# Patient Record
Sex: MALE | Race: WHITE | NOT HISPANIC OR LATINO | Employment: OTHER | URBAN - METROPOLITAN AREA
[De-identification: names, ages, dates, MRNs, and addresses within clinical notes are randomized per-mention and may not be internally consistent; named-entity substitution may affect disease eponyms.]

---

## 2021-01-14 DIAGNOSIS — Z23 NEED FOR VACCINATION: ICD-10-CM

## 2022-01-26 ENCOUNTER — APPOINTMENT (OUTPATIENT)
Dept: RADIOLOGY | Facility: MEDICAL CENTER | Age: 77
DRG: 200 | End: 2022-01-26
Attending: EMERGENCY MEDICINE
Payer: COMMERCIAL

## 2022-01-26 ENCOUNTER — HOSPITAL ENCOUNTER (INPATIENT)
Facility: MEDICAL CENTER | Age: 77
LOS: 8 days | DRG: 200 | End: 2022-02-04
Attending: EMERGENCY MEDICINE | Admitting: SURGERY
Payer: COMMERCIAL

## 2022-01-26 DIAGNOSIS — S22.42XA TRAUMATIC FRACTURE OF RIBS OF LEFT SIDE WITH PNEUMOTHORAX: ICD-10-CM

## 2022-01-26 DIAGNOSIS — S27.0XXA TRAUMATIC FRACTURE OF RIBS OF LEFT SIDE WITH PNEUMOTHORAX: ICD-10-CM

## 2022-01-26 DIAGNOSIS — T79.6XXA TRAUMATIC RHABDOMYOLYSIS, INITIAL ENCOUNTER (HCC): ICD-10-CM

## 2022-01-26 DIAGNOSIS — T14.90XA TRAUMA: ICD-10-CM

## 2022-01-26 DIAGNOSIS — R29.6 FREQUENT FALLS: ICD-10-CM

## 2022-01-26 DIAGNOSIS — E86.0 DEHYDRATION: ICD-10-CM

## 2022-01-26 DIAGNOSIS — S22.49XA MULTIPLE RIB FRACTURES INVOLVING FOUR OR MORE RIBS: ICD-10-CM

## 2022-01-26 DIAGNOSIS — S32.425A CLOSED NONDISPLACED FRACTURE OF POSTERIOR WALL OF LEFT ACETABULUM, INITIAL ENCOUNTER (HCC): ICD-10-CM

## 2022-01-26 LAB
ERYTHROCYTE [DISTWIDTH] IN BLOOD BY AUTOMATED COUNT: 48.2 FL (ref 35.9–50)
HCT VFR BLD AUTO: 28.4 % (ref 42–52)
HGB BLD-MCNC: 9.7 G/DL (ref 14–18)
MCH RBC QN AUTO: 33.1 PG (ref 27–33)
MCHC RBC AUTO-ENTMCNC: 34.2 G/DL (ref 33.7–35.3)
MCV RBC AUTO: 96.9 FL (ref 81.4–97.8)
PLATELET # BLD AUTO: 216 K/UL (ref 164–446)
PMV BLD AUTO: 9.5 FL (ref 9–12.9)
RBC # BLD AUTO: 2.93 M/UL (ref 4.7–6.1)
WBC # BLD AUTO: 8.1 K/UL (ref 4.8–10.8)

## 2022-01-26 PROCEDURE — 84484 ASSAY OF TROPONIN QUANT: CPT

## 2022-01-26 PROCEDURE — 99291 CRITICAL CARE FIRST HOUR: CPT

## 2022-01-26 PROCEDURE — 85730 THROMBOPLASTIN TIME PARTIAL: CPT

## 2022-01-26 PROCEDURE — 82550 ASSAY OF CK (CPK): CPT

## 2022-01-26 PROCEDURE — 82077 ASSAY SPEC XCP UR&BREATH IA: CPT

## 2022-01-26 PROCEDURE — 83540 ASSAY OF IRON: CPT

## 2022-01-26 PROCEDURE — G0390 TRAUMA RESPONS W/HOSP CRITI: HCPCS

## 2022-01-26 PROCEDURE — 86850 RBC ANTIBODY SCREEN: CPT

## 2022-01-26 PROCEDURE — 83550 IRON BINDING TEST: CPT

## 2022-01-26 PROCEDURE — 85610 PROTHROMBIN TIME: CPT

## 2022-01-26 PROCEDURE — 80053 COMPREHEN METABOLIC PANEL: CPT

## 2022-01-26 PROCEDURE — 86901 BLOOD TYPING SEROLOGIC RH(D): CPT

## 2022-01-26 PROCEDURE — 85027 COMPLETE CBC AUTOMATED: CPT

## 2022-01-26 PROCEDURE — 85046 RETICYTE/HGB CONCENTRATE: CPT

## 2022-01-26 PROCEDURE — 86900 BLOOD TYPING SEROLOGIC ABO: CPT

## 2022-01-27 ENCOUNTER — HOSPITAL ENCOUNTER (OUTPATIENT)
Dept: RADIOLOGY | Facility: MEDICAL CENTER | Age: 77
End: 2022-01-27

## 2022-01-27 ENCOUNTER — APPOINTMENT (OUTPATIENT)
Dept: RADIOLOGY | Facility: MEDICAL CENTER | Age: 77
DRG: 200 | End: 2022-01-27
Attending: SURGERY
Payer: COMMERCIAL

## 2022-01-27 PROBLEM — R29.6 FREQUENT FALLS: Status: ACTIVE | Noted: 2022-01-27

## 2022-01-27 PROBLEM — Z11.52 ENCOUNTER FOR SCREENING FOR COVID-19: Status: ACTIVE | Noted: 2022-01-27

## 2022-01-27 PROBLEM — Z78.9 NO CONTRAINDICATION TO DEEP VEIN THROMBOSIS (DVT) PROPHYLAXIS: Status: ACTIVE | Noted: 2022-01-27

## 2022-01-27 PROBLEM — N40.0 BPH (BENIGN PROSTATIC HYPERPLASIA): Status: ACTIVE | Noted: 2022-01-27

## 2022-01-27 PROBLEM — S22.49XA MULTIPLE RIB FRACTURES INVOLVING FOUR OR MORE RIBS: Status: ACTIVE | Noted: 2022-01-27

## 2022-01-27 PROBLEM — I10 HYPERTENSION: Status: ACTIVE | Noted: 2022-01-27

## 2022-01-27 PROBLEM — S72.002A FRACTURE OF LEFT HIP (HCC): Status: ACTIVE | Noted: 2022-01-27

## 2022-01-27 PROBLEM — S27.0XXA TRAUMATIC PNEUMOTHORAX: Status: ACTIVE | Noted: 2022-01-27

## 2022-01-27 PROBLEM — T14.90XA TRAUMA: Status: ACTIVE | Noted: 2022-01-27

## 2022-01-27 PROBLEM — E78.5 HYPERLIPIDEMIA: Status: ACTIVE | Noted: 2022-01-27

## 2022-01-27 PROBLEM — M25.552 LEFT HIP PAIN: Status: ACTIVE | Noted: 2022-01-27

## 2022-01-27 PROBLEM — Z53.09 CONTRAINDICATION TO DEEP VEIN THROMBOSIS (DVT) PROPHYLAXIS: Status: ACTIVE | Noted: 2022-01-27

## 2022-01-27 LAB
ABO + RH BLD: NORMAL
ABO GROUP BLD: NORMAL
ALBUMIN SERPL BCP-MCNC: 2.9 G/DL (ref 3.2–4.9)
ALBUMIN/GLOB SERPL: 1.5 G/DL
ALP SERPL-CCNC: 71 U/L (ref 30–99)
ALT SERPL-CCNC: 19 U/L (ref 2–50)
ANION GAP SERPL CALC-SCNC: 15 MMOL/L (ref 7–16)
APTT PPP: 28.9 SEC (ref 24.7–36)
AST SERPL-CCNC: 47 U/L (ref 12–45)
BILIRUB SERPL-MCNC: 2.2 MG/DL (ref 0.1–1.5)
BLD GP AB SCN SERPL QL: NORMAL
BUN SERPL-MCNC: 17 MG/DL (ref 8–22)
CALCIUM SERPL-MCNC: 8.1 MG/DL (ref 8.5–10.5)
CHLORIDE SERPL-SCNC: 105 MMOL/L (ref 96–112)
CK SERPL-CCNC: 1008 U/L (ref 0–154)
CK SERPL-CCNC: 573 U/L (ref 0–154)
CO2 SERPL-SCNC: 17 MMOL/L (ref 20–33)
CREAT SERPL-MCNC: 0.83 MG/DL (ref 0.5–1.4)
ETHANOL BLD-MCNC: <10.1 MG/DL (ref 0–10)
GLOBULIN SER CALC-MCNC: 2 G/DL (ref 1.9–3.5)
GLUCOSE SERPL-MCNC: 71 MG/DL (ref 65–99)
HGB RETIC QN AUTO: 35.9 PG/CELL (ref 29–35)
IMM RETICS NFR: 20.7 % (ref 9.3–17.4)
INR PPP: 1.44 (ref 0.87–1.13)
IRON SATN MFR SERPL: 22 % (ref 15–55)
IRON SERPL-MCNC: 42 UG/DL (ref 50–180)
POTASSIUM SERPL-SCNC: 3.6 MMOL/L (ref 3.6–5.5)
PROT SERPL-MCNC: 4.9 G/DL (ref 6–8.2)
PROTHROMBIN TIME: 17.1 SEC (ref 12–14.6)
RETICS # AUTO: 0.09 M/UL (ref 0.04–0.06)
RETICS/RBC NFR: 3.2 % (ref 0.8–2.1)
RH BLD: NORMAL
SARS-COV+SARS-COV-2 AG RESP QL IA.RAPID: NOTDETECTED
SODIUM SERPL-SCNC: 137 MMOL/L (ref 135–145)
SPECIMEN SOURCE: NORMAL
TIBC SERPL-MCNC: 189 UG/DL (ref 250–450)
TROPONIN T SERPL-MCNC: 53 NG/L (ref 6–19)
UIBC SERPL-MCNC: 147 UG/DL (ref 110–370)

## 2022-01-27 PROCEDURE — 71045 X-RAY EXAM CHEST 1 VIEW: CPT

## 2022-01-27 PROCEDURE — 700105 HCHG RX REV CODE 258: Performed by: EMERGENCY MEDICINE

## 2022-01-27 PROCEDURE — 700101 HCHG RX REV CODE 250: Performed by: SURGERY

## 2022-01-27 PROCEDURE — 700111 HCHG RX REV CODE 636 W/ 250 OVERRIDE (IP): Performed by: SURGERY

## 2022-01-27 PROCEDURE — 770022 HCHG ROOM/CARE - ICU (200)

## 2022-01-27 PROCEDURE — 700102 HCHG RX REV CODE 250 W/ 637 OVERRIDE(OP): Performed by: NURSE PRACTITIONER

## 2022-01-27 PROCEDURE — 700105 HCHG RX REV CODE 258: Performed by: SURGERY

## 2022-01-27 PROCEDURE — 32551 INSERTION OF CHEST TUBE: CPT | Mod: LT | Performed by: SURGERY

## 2022-01-27 PROCEDURE — 99152 MOD SED SAME PHYS/QHP 5/>YRS: CPT

## 2022-01-27 PROCEDURE — 32551 INSERTION OF CHEST TUBE: CPT

## 2022-01-27 PROCEDURE — C1729 CATH, DRAINAGE: HCPCS

## 2022-01-27 PROCEDURE — 99233 SBSQ HOSP IP/OBS HIGH 50: CPT | Performed by: SURGERY

## 2022-01-27 PROCEDURE — 0W9B30Z DRAINAGE OF LEFT PLEURAL CAVITY WITH DRAINAGE DEVICE, PERCUTANEOUS APPROACH: ICD-10-PCS | Performed by: SURGERY

## 2022-01-27 PROCEDURE — 700102 HCHG RX REV CODE 250 W/ 637 OVERRIDE(OP): Performed by: SURGERY

## 2022-01-27 PROCEDURE — 99291 CRITICAL CARE FIRST HOUR: CPT | Mod: 25 | Performed by: SURGERY

## 2022-01-27 PROCEDURE — 87426 SARSCOV CORONAVIRUS AG IA: CPT

## 2022-01-27 PROCEDURE — 94669 MECHANICAL CHEST WALL OSCILL: CPT

## 2022-01-27 PROCEDURE — A9270 NON-COVERED ITEM OR SERVICE: HCPCS | Performed by: NURSE PRACTITIONER

## 2022-01-27 PROCEDURE — 82550 ASSAY OF CK (CPK): CPT

## 2022-01-27 PROCEDURE — A9270 NON-COVERED ITEM OR SERVICE: HCPCS | Performed by: SURGERY

## 2022-01-27 RX ORDER — BISACODYL 10 MG
10 SUPPOSITORY, RECTAL RECTAL
Status: DISCONTINUED | OUTPATIENT
Start: 2022-01-27 | End: 2022-02-04 | Stop reason: HOSPADM

## 2022-01-27 RX ORDER — LISINOPRIL 10 MG/1
10 TABLET ORAL DAILY
COMMUNITY

## 2022-01-27 RX ORDER — MORPHINE SULFATE 4 MG/ML
2 INJECTION INTRAVENOUS
Status: DISCONTINUED | OUTPATIENT
Start: 2022-01-27 | End: 2022-02-01

## 2022-01-27 RX ORDER — DOCUSATE SODIUM 100 MG/1
100 CAPSULE, LIQUID FILLED ORAL 2 TIMES DAILY
Status: DISCONTINUED | OUTPATIENT
Start: 2022-01-27 | End: 2022-02-04 | Stop reason: HOSPADM

## 2022-01-27 RX ORDER — AMOXICILLIN 250 MG
1 CAPSULE ORAL NIGHTLY
Status: DISCONTINUED | OUTPATIENT
Start: 2022-01-27 | End: 2022-02-03

## 2022-01-27 RX ORDER — ONDANSETRON 2 MG/ML
4 INJECTION INTRAMUSCULAR; INTRAVENOUS EVERY 4 HOURS PRN
Status: DISCONTINUED | OUTPATIENT
Start: 2022-01-27 | End: 2022-02-01

## 2022-01-27 RX ORDER — POLYETHYLENE GLYCOL 3350 17 G/17G
1 POWDER, FOR SOLUTION ORAL 2 TIMES DAILY
Status: DISCONTINUED | OUTPATIENT
Start: 2022-01-27 | End: 2022-02-03

## 2022-01-27 RX ORDER — PROPOFOL 10 MG/ML
200 INJECTION, EMULSION INTRAVENOUS ONCE
Status: COMPLETED | OUTPATIENT
Start: 2022-01-27 | End: 2022-01-27

## 2022-01-27 RX ORDER — TERAZOSIN 5 MG/1
5 CAPSULE ORAL NIGHTLY
Status: DISCONTINUED | OUTPATIENT
Start: 2022-01-27 | End: 2022-02-04 | Stop reason: HOSPADM

## 2022-01-27 RX ORDER — SODIUM CHLORIDE 9 MG/ML
INJECTION, SOLUTION INTRAVENOUS CONTINUOUS
Status: DISCONTINUED | OUTPATIENT
Start: 2022-01-27 | End: 2022-01-27

## 2022-01-27 RX ORDER — TERAZOSIN 5 MG/1
5 CAPSULE ORAL NIGHTLY
COMMUNITY

## 2022-01-27 RX ORDER — LIDOCAINE 50 MG/G
1 PATCH TOPICAL EVERY 24 HOURS
Status: DISCONTINUED | OUTPATIENT
Start: 2022-01-27 | End: 2022-02-03

## 2022-01-27 RX ORDER — TRAMADOL HYDROCHLORIDE 50 MG/1
50 TABLET ORAL EVERY 4 HOURS PRN
Status: DISCONTINUED | OUTPATIENT
Start: 2022-01-27 | End: 2022-02-03

## 2022-01-27 RX ORDER — ONDANSETRON 4 MG/1
4 TABLET, ORALLY DISINTEGRATING ORAL EVERY 4 HOURS PRN
Status: DISCONTINUED | OUTPATIENT
Start: 2022-01-27 | End: 2022-02-04 | Stop reason: HOSPADM

## 2022-01-27 RX ORDER — AMOXICILLIN 250 MG
1 CAPSULE ORAL
Status: DISCONTINUED | OUTPATIENT
Start: 2022-01-27 | End: 2022-02-04 | Stop reason: HOSPADM

## 2022-01-27 RX ORDER — LISINOPRIL 10 MG/1
10 TABLET ORAL DAILY
Status: DISCONTINUED | OUTPATIENT
Start: 2022-01-27 | End: 2022-02-04 | Stop reason: HOSPADM

## 2022-01-27 RX ORDER — CALCIUM CARBONATE 500 MG/1
500 TABLET, CHEWABLE ORAL 2 TIMES DAILY
Status: ON HOLD | COMMUNITY
End: 2022-01-27

## 2022-01-27 RX ORDER — ACETAMINOPHEN 325 MG/1
650 TABLET ORAL 4 TIMES DAILY
Status: DISPENSED | OUTPATIENT
Start: 2022-01-27 | End: 2022-02-01

## 2022-01-27 RX ORDER — ENEMA 19; 7 G/133ML; G/133ML
1 ENEMA RECTAL
Status: DISCONTINUED | OUTPATIENT
Start: 2022-01-27 | End: 2022-02-04 | Stop reason: HOSPADM

## 2022-01-27 RX ORDER — SODIUM CHLORIDE, SODIUM LACTATE, POTASSIUM CHLORIDE, CALCIUM CHLORIDE 600; 310; 30; 20 MG/100ML; MG/100ML; MG/100ML; MG/100ML
INJECTION, SOLUTION INTRAVENOUS CONTINUOUS
Status: DISCONTINUED | OUTPATIENT
Start: 2022-01-27 | End: 2022-01-29

## 2022-01-27 RX ORDER — SIMVASTATIN 20 MG
20 TABLET ORAL NIGHTLY
COMMUNITY

## 2022-01-27 RX ORDER — SIMVASTATIN 10 MG
20 TABLET ORAL NIGHTLY
Status: DISCONTINUED | OUTPATIENT
Start: 2022-01-27 | End: 2022-02-04 | Stop reason: HOSPADM

## 2022-01-27 RX ORDER — GABAPENTIN 100 MG/1
100 CAPSULE ORAL EVERY 8 HOURS
Status: DISCONTINUED | OUTPATIENT
Start: 2022-01-27 | End: 2022-02-01

## 2022-01-27 RX ORDER — TRAMADOL HYDROCHLORIDE 50 MG/1
25 TABLET ORAL EVERY 4 HOURS PRN
Status: DISCONTINUED | OUTPATIENT
Start: 2022-01-27 | End: 2022-02-03

## 2022-01-27 RX ADMIN — DOCUSATE SODIUM 100 MG: 100 CAPSULE, LIQUID FILLED ORAL at 05:39

## 2022-01-27 RX ADMIN — TERAZOSIN HYDROCHLORIDE 5 MG: 5 CAPSULE ORAL at 21:07

## 2022-01-27 RX ADMIN — METOPROLOL TARTRATE 12.5 MG: 25 TABLET, FILM COATED ORAL at 05:38

## 2022-01-27 RX ADMIN — LISINOPRIL 10 MG: 5 TABLET ORAL at 05:38

## 2022-01-27 RX ADMIN — SENNOSIDES AND DOCUSATE SODIUM 1 TABLET: 50; 8.6 TABLET ORAL at 21:08

## 2022-01-27 RX ADMIN — SODIUM CHLORIDE: 9 INJECTION, SOLUTION INTRAVENOUS at 00:30

## 2022-01-27 RX ADMIN — GABAPENTIN 100 MG: 100 CAPSULE ORAL at 05:40

## 2022-01-27 RX ADMIN — SIMVASTATIN 20 MG: 10 TABLET, FILM COATED ORAL at 21:06

## 2022-01-27 RX ADMIN — PROPOFOL 30 MG: 10 INJECTION, EMULSION INTRAVENOUS at 01:32

## 2022-01-27 RX ADMIN — LIDOCAINE 1 PATCH: 50 PATCH TOPICAL at 05:39

## 2022-01-27 RX ADMIN — GABAPENTIN 100 MG: 100 CAPSULE ORAL at 21:06

## 2022-01-27 RX ADMIN — ACETAMINOPHEN 650 MG: 325 TABLET, FILM COATED ORAL at 21:06

## 2022-01-27 RX ADMIN — METOPROLOL TARTRATE 12.5 MG: 25 TABLET, FILM COATED ORAL at 17:18

## 2022-01-27 RX ADMIN — SODIUM CHLORIDE, POTASSIUM CHLORIDE, SODIUM LACTATE AND CALCIUM CHLORIDE: 600; 310; 30; 20 INJECTION, SOLUTION INTRAVENOUS at 01:15

## 2022-01-27 RX ADMIN — SODIUM CHLORIDE, POTASSIUM CHLORIDE, SODIUM LACTATE AND CALCIUM CHLORIDE: 600; 310; 30; 20 INJECTION, SOLUTION INTRAVENOUS at 15:28

## 2022-01-27 RX ADMIN — PROPOFOL 30 MG: 10 INJECTION, EMULSION INTRAVENOUS at 01:29

## 2022-01-27 ASSESSMENT — COPD QUESTIONNAIRES
DURING THE PAST 4 WEEKS HOW MUCH DID YOU FEEL SHORT OF BREATH: NONE/LITTLE OF THE TIME
HAVE YOU SMOKED AT LEAST 100 CIGARETTES IN YOUR ENTIRE LIFE: YES
DO YOU EVER COUGH UP ANY MUCUS OR PHLEGM?: NO/ONLY WITH OCCASIONAL COLDS OR INFECTIONS
COPD SCREENING SCORE: 4

## 2022-01-27 ASSESSMENT — PATIENT HEALTH QUESTIONNAIRE - PHQ9
2. FEELING DOWN, DEPRESSED, IRRITABLE, OR HOPELESS: NOT AT ALL
SUM OF ALL RESPONSES TO PHQ9 QUESTIONS 1 AND 2: 0
1. LITTLE INTEREST OR PLEASURE IN DOING THINGS: NOT AT ALL

## 2022-01-27 ASSESSMENT — PAIN DESCRIPTION - PAIN TYPE
TYPE: ACUTE PAIN

## 2022-01-27 ASSESSMENT — FIBROSIS 4 INDEX: FIB4 SCORE: 3.89

## 2022-01-27 NOTE — PROGRESS NOTES
Pt arrived to S123 with ICU team    HR 85  /74  Sp02 99%  RR 22  Temp 98.1F  Wt 83.1kg    4 Eyes Skin Assessment Completed by Davida, RN and EMELYN Mustafa.    Head WDL  Ears WDL  Nose WDL  Mouth WDL  Neck WDL  Breast/Chest WDL  Shoulder Blades WDL  Spine WDL  (R) Arm/Elbow/Hand Redness, Blanching, Bruising and Abrasion  (L) Arm/Elbow/Hand Redness, Blanching, Bruising and Abrasion  Abdomen WDL  Groin WDL  Scrotum/Coccyx/Buttocks Moisture Fissure, scrotal redness  (R) Leg Edema, bruising, abrasion  (L) Leg Edema, bruising/swelling and non blanching  (R) Heel/Foot/Toe Blanching  (L) Heel/Foot/Toe Blanching          Devices In Places ECG, Tele Box, Blood Pressure Cuff, Pulse Ox, Maya, SCD's and Nasal Cannula      Interventions In Place NC W/Ear Foams, Sacral Mepilex, TAP System, Pillows, Q2 Turns, Low Air Loss Mattress, ZFlo Pillow and Heels Loaded W/Pillows    Possible Skin Injury Yes    Pictures Uploaded Into Epic Yes  Wound Consult Placed Yes  RN Wound Prevention Protocol Ordered Yes

## 2022-01-27 NOTE — ASSESSMENT & PLAN NOTE
Small left pneumothorax.  Left chest tube placed in ICU.  1/31 Chest tube removal, 2 sutures remain.  CXR s/p tube removal negative for pneumothorax.  Aggressive pulmonary hygiene and serial chest radiography.

## 2022-01-27 NOTE — PROGRESS NOTES
2 RN skin check performed with Caterina DUNAWAY    Patient has scattered abrasions, scabs and bruises in different stages of healing. See photos below

## 2022-01-27 NOTE — ASSESSMENT & PLAN NOTE
Fractures of the 3rd, 4th, 5th, and 6th left ribs.  Aggressive pulmonary hygiene and multimodal pain management.

## 2022-01-27 NOTE — DISCHARGE PLANNING
Patient has an Advance Directive on file.   DPOA is friend, Michael Keenan [phone]) [address]. [place] [address]     First alternative: Rossy Keenan ([phone])   Same address as above

## 2022-01-27 NOTE — PROGRESS NOTES
CT pelvis reviewed   No acetabular fracture is seen, some osteophytes and arthritis present  No surgery required  WBAT

## 2022-01-27 NOTE — PROGRESS NOTES
Med rec complete per pt at bedside.  Allergies reviewed and updated.  Confirmed patient's home pharmacy preference.    Pt reports no ABX in the last 30 days.

## 2022-01-27 NOTE — ASSESSMENT & PLAN NOTE
Report of left hip fracture on read from [facility] imaging.  No fracture seen on review by Dr Baeza.  Weight bearing status - Weightbearing as tolerated LLE.  Seth Baeza MD. Orthopedic Surgeon. [facility].

## 2022-01-27 NOTE — ED NOTES
[facility] Pt having multiple increase in falls. Early this morning patient fell off his couch onto his left side sustaining rip fractures, hip fracture and a pneumothorax. He was found hours later by his neighbor.    Patient transferred for further care. Evaluated in trauma bay. Blood drawn and chest xray performed.  Patient resting comfortably on the gurney. Pt received 2L of fluid and 2G of rocephin.   VSS. Taken to zari 16, report given .

## 2022-01-27 NOTE — ED NOTES
Assumed care of patient. Resting in bed. Placed on 2 L/min O2 for support. Spo2 on RA 95%. Denies pain at this time

## 2022-01-27 NOTE — ASSESSMENT & PLAN NOTE
Fall from a couch one night prior to presentation.   Found down by a neighbor after 6-7 hrs.  History of frequent falls  Trauma Green Transfer Activation.  Lee Grimaldo MD. Trauma Surgery.

## 2022-01-27 NOTE — ED PROVIDER NOTES
"ED Provider Note     Scribed for Saray Miller D.O. by Walt Crespo. 1/26/2022, 11:32 PM.     Primary care provider: Patient states none    Means of arrival: EMS          History obtained from: Patient and EMS   History limited by: None     CHIEF COMPLAINT  Left chest wall pain   Left hip pain   Ground level fall.     HPI  Jurgen Valdez is a 76 y.o. male who presents to the emergency Department by EMS from the [facility] for evaluation of multiple left rib fractures with pneumothorax and left hip fracture following ground level fall onset last night. Per EMS, patient was found today by his neighbor following a ground level fall from his couch. Patient was on the ground for approximately 10 hours. EMS adds that patient has been having increased number of falls lately. CT results from previous facility reports rib fractures to the left 3, 4,5, and 6 with moderate pneumo and a left hip fracture. In the ED, patient has a GCS of 15 and has no significant pain at this time. He states that he \"feels comfortable.\" Patient adds that his neighbor checks up on him everyday.  Patient is not on oxygen at baseline. Denies loss of consciousness. Patient has additional history of left arthroplasty.    REVIEW OF SYSTEMS  See HPI for further details. All other systems are negative.    PAST MEDICAL HISTORY  None reported     FAMILY HISTORY  None reported     SOCIAL HISTORY  Social History     Tobacco Use   • Smoking status:    • Smokeless tobacco:    Substance Use Topics   • Alcohol use:    • Drug use:       Social History     Substance and Sexual Activity   Drug Use        SURGICAL HISTORY  Left arthroplasty     CURRENT MEDICATIONS  No current outpatient medications     ALLERGIES  None reported     PHYSICAL EXAM  VITAL SIGNS: /59   Pulse 86   Temp 36.7 °C (98 °F) (Temporal)   Resp 16   SpO2 97%     Constitutional: Patient is well developed, well nourished, very pleasant and cooperative, mild distress at this time after " having been medicated prior to arrival.  HENT: Normocephalic, atraumatic. No oral trauma.. Nose normal with no mucosal edema or drainage. Oropharynx moist without erythema or exudates.  Eyes: 2 mm in diameter,  PERRL, EOMI,  Neck: Supple  Normal range of motion in flexion, extension and lateral rotation. No tenderness along the bony prominences or paraspinal muscles.   Lymphatic: No lymphadenopathy noted.   Cardiovascular: Normal heart rate and Regular rhythm. No murmur  Thorax & Lungs:.  Decreased breath sounds on the left chest.  Mild respiratory distress, no rhonchi, wheezing.  Left chest wall tenderness with no crepitus.   Abdomen: Bowel sounds normal in all four quadrants. Soft,nontender, no signs of trauma.   Skin: Warm, Dry  Back: No cervical, thoracic, or lumbosacral tenderness.   Extremities: Peripheral pulses 4/4. Ecchymotic changes in medial left thigh with contusions over the left knee. Left leg is short compared to the right. 2+ pitting edema in his right lower extremity.    Musculoskeletal: Refer extremities.   Neurologic: Alert & oriented x 3, Normal sensory function, No lateralizing or focal deficits noted. DTR's 4/4 bilaterally.  Psychiatric: Affect normal, Judgment normal, Mood normal.     DIAGNOSTICS/PROCEDURES    LABS  Results for orders placed or performed during the hospital encounter of 01/26/22   DIAGNOSTIC ALCOHOL   Result Value Ref Range    Diagnostic Alcohol <10.1 0.0 - 10.0 mg/dL   CBC WITHOUT DIFFERENTIAL   Result Value Ref Range    WBC 8.1 4.8 - 10.8 K/uL    RBC 2.93 (L) 4.70 - 6.10 M/uL    Hemoglobin 9.7 (L) 14.0 - 18.0 g/dL    Hematocrit 28.4 (L) 42.0 - 52.0 %    MCV 96.9 81.4 - 97.8 fL    MCH 33.1 (H) 27.0 - 33.0 pg    MCHC 34.2 33.7 - 35.3 g/dL    RDW 48.2 35.9 - 50.0 fL    Platelet Count 216 164 - 446 K/uL    MPV 9.5 9.0 - 12.9 fL   Comp Metabolic Panel   Result Value Ref Range    Sodium 137 135 - 145 mmol/L    Potassium 3.6 3.6 - 5.5 mmol/L    Chloride 105 96 - 112 mmol/L    Co2  17 (L) 20 - 33 mmol/L    Anion Gap 15.0 7.0 - 16.0    Glucose 71 65 - 99 mg/dL    Bun 17 8 - 22 mg/dL    Creatinine 0.83 0.50 - 1.40 mg/dL    Calcium 8.1 (L) 8.5 - 10.5 mg/dL    AST(SGOT) 47 (H) 12 - 45 U/L    ALT(SGPT) 19 2 - 50 U/L    Alkaline Phosphatase 71 30 - 99 U/L    Total Bilirubin 2.2 (H) 0.1 - 1.5 mg/dL    Albumin 2.9 (L) 3.2 - 4.9 g/dL    Total Protein 4.9 (L) 6.0 - 8.2 g/dL    Globulin 2.0 1.9 - 3.5 g/dL    A-G Ratio 1.5 g/dL   Prothrombin Time   Result Value Ref Range    PT 17.1 (H) 12.0 - 14.6 sec    INR 1.44 (H) 0.87 - 1.13   APTT   Result Value Ref Range    APTT 28.9 24.7 - 36.0 sec   CREATINE KINASE   Result Value Ref Range    CPK Total 1008 (H) 0 - 154 U/L   TROPONIN   Result Value Ref Range    Troponin T 53 (H) 6 - 19 ng/L   COD - Adult (Type and Screen)   Result Value Ref Range    ABO Grouping Only A     Rh Grouping Only POS     Antibody Screen-Cod NEG    ESTIMATED GFR   Result Value Ref Range    GFR If African American >60 >60 mL/min/1.73 m 2    GFR If Non African American >60 >60 mL/min/1.73 m 2      Labs reviewed by me    RADIOLOGY/PROCEDURES  DX-CHEST-LIMITED (1 VIEW)   Final Result      1.  Small LEFT pneumothorax, similar to outside prior radiographs   2.  LEFT basilar atelectasis, less likely contusion or other airspace disease      OUTSIDE IMAGES-DX CHEST   Final Result      OUTSIDE IMAGES-CT CHEST   Final Result      OUTSIDE IMAGES-CT CERVICAL SPINE   Final Result      OUTSIDE IMAGES-CT HEAD   Final Result      CT-FOREIGN FILM CAT SCAN   Final Result      OUTSIDE IMAGES-CT HEAD   Final Result      CT-FOREIGN FILM CAT SCAN   Final Result        Results and radiologist interpretation reviewed by me.     COURSE & MEDICAL DECISION MAKING  Pertinent Labs & Imaging studies reviewed. (See chart for details)    Obtained and reviewed past medical records from 1/26/22 which indicated patient has an acetabular fracture of the left hip. He has multiple left rib fractures involving ribs 3 through 6.  Ribs 3 through 5 look subacute and is minimally displaced. Scan also indicates a moderate to large left pneumothorax.     11:32 PM - Patient evaluated at bedside. Ordered DX-chest, Diagnostic alcohol, CBC without diff., CMP, INR, APTT, Creatine, Troponin, Component cellular, COD, and ABO Rh for further evaluation. Discussed with patient about administering basic labs and imaging for further evaluation. Patient verbalizes understanding and agreement to this plan of care.     11:46 PM Paged Ortho and Trauma    11:53 PM - I discussed the patient's case and the above findings with Dr. Baeza (Ortho) who will consult with patient in the morning     11:59 PM - I discussed the patient's case and the above findings with Dr. Maxwell (Trauma) who would like to see patient's radiology results before admit.     12:39 AM Dr. Hanks (Trauma) who agreed to hospitalize the patient after reviewing radiology.  Patient's care was transferred at this time.     Patient will be transferred to trauma surgical floor for chest tube placement, Ortho evaluation and further work-up.  He is in very guarded condition.    DISPOSITION:  Patient will be hospitalized by Dr. Maxwell in guarded condition.     FINAL IMPRESSION  1. Traumatic fracture of ribs of left side with pneumothorax    2. Frequent falls    3. Closed nondisplaced fracture of posterior wall of left acetabulum, initial encounter ([facility])    4. Dehydration    5. Traumatic rhabdomyolysis, initial encounter ([facility])         Walt LOPEZ (Marlys), am scribing for, and in the presence of, Saray Miller D.O..    Electronically signed by: Walt Crespo (Marlys), 1/26/2022    Saray LOPEZ D.O. personally performed the services described in this documentation, as scribed by Walt Crespo in my presence, and it is both accurate and complete.    C    The note accurately reflects work and decisions made by me.  Saray Miller D.O.  1/27/2022  6:31 AM

## 2022-01-27 NOTE — H&P
TRAUMA HISTORY AND PHYSICAL    DATE OF SERVICE: 1/27/2022    ACTIVATION LEVEL: Green transfer.     HISTORY OF PRESENT ILLNESS: The patient is a 76 year-old male who was injured after sustaining a series of ground level falls. Most recently, he fell and was down until being found by his neighbor.    The patient was initially evaluated at [facility] in [place], [place] where CT imaging demonstrated multiple rib fractures, a pneumothorax, and a hip fracture. He was transported to [facility] in [place], [place] for a definitive trauma evaluation.    The patient was evaluated at bedside.  He reports only mild left chest wall pain.  No dyspnea or shortness of breath.    PAST MEDICAL HISTORY: Hypertension, BPH    PAST SURGICAL HISTORY:   No significant or pertinent past surgical history     ALLERGIES:   No significant history of allergies     CURRENT MEDICATIONS:   Outpatient Medications Marked as Taking for the 1/26/22 encounter (Hospital Encounter)   Medication Sig   • lisinopril (PRINIVIL) 10 MG Tab Take 10 mg by mouth every day.   • metoprolol tartrate (LOPRESSOR) 25 MG Tab Take 12.5 mg by mouth 2 times a day.   • simvastatin (ZOCOR) 20 MG Tab Take 20 mg by mouth every evening.   • terazosin (HYTRIN) 5 MG Cap Take 5 mg by mouth every evening.   • calcium carbonate (TUMS) 500 MG Chew Tab Chew 500 mg 2 times a day.       FAMILY HISTORY:   Reviewed and found to be non-contributory in regards to the above presentation    SOCIAL HISTORY:    Patient denies habitual use of alcohol, tobacco, and illicit drugs    REVIEW OF SYSTEMS:   Comprehensive review of systems is negative with the exception of the aforementioned HPI, PMH, and PSH bullets in accordance with CMS guidelines.    PHYSICAL EXAMINATION:   VITAL SIGNS:   · /65   Pulse 83   Temp 36.7 °C (98 °F) (Temporal)   Resp 15   Ht 1.829 m (6')   Wt 83.9 kg (185 lb)   SpO2 97%     GENERAL:   · The patient appears stated age, is in no apparent  distress    HEENT:  · HEAD: Atraumatic, normocephalic.    · EARS: The ear canals and tympanic membranes are normal.Normal pinna bilaterally.    · EYES:  The pupils are equal, round, and reactive to light bilaterally. The extraocular muscles are intact bilaterally..    · NOSE: No rhinorrhea.  The bilateral nares are clear.  · THROAT: Oral mucosa is moist.  The oropharynx are clear.    FACE:   · The midface and jaw are stable. No malocclusion is evident.    NECK:    · The cervical spine was examined utilizing spinal motion restriction.   · No posterior midline cervical-spine tenderness, no evidence of intoxication, normal level of alertness (Ceci Coma Scale 15), no focal neurologic deficit, and no painful distracting injuries..    CHEST:    · Inspection: Unlabored respirations, no intercostal retractions, paradoxical motion, or accessory muscle use.  · Palpation:  The chest is tender over the left lateral chest wall without crepitance. The clavicles are non deformed bilaterally..  · Auscultation: clear to auscultation.    CARDIOVASCULAR:    · Auscultation: regular rate and rhythm.  · Peripheral Pulses: Normal.      ABDOMEN:    · Abdomen is soft, nontender, without organomegaly or masses.    BACK/PELVIS:    · The thoracolumbar spine was examined utilizing spinal motion restriction.   · Inspection and palpation reveal no significant tenderness, swelling, or deformity in the thoracolumbar region.  · The pelvis is stable.    RECTAL:  Deferred    GENITOURINARY:  The patient has normal external reproductive anatomy.    EXTREMITIES:  · RIGHT ARM: Without deformities, wounds, lacerations, or excoriations.  Full passive and active range of motion without pain.  · LEFT ARM: Without deformities, wounds, lacerations, or excoriations.  Full passive and active range of motion without pain.  · RIGHT LEG: Without deformities, wounds, lacerations, or excoriations.  Full passive and active range of motion without pain.  · LEFT LEG:  Without deformities, wounds, lacerations, or excoriations.  Full passive and active range of motion without pain.    NEUROLOGIC:    · Ceci Coma Scale (GCS) 15.   · Neurologic examination revealed no focal deficits noted, mental status intact, cranial nerves II through XII intact, muscle tone normal, muscle strength normal, oriented for age x3.    SKIN:  · The skin is warm, dry and well purfused.    PSYCHIATRIC:   · The patient does not appear depressed or anxious.    LABORATORY VALUES:   Recent Labs     01/26/22 2330   WBC 8.1   RBC 2.93*   HEMOGLOBIN 9.7*   HEMATOCRIT 28.4*   MCV 96.9   MCH 33.1*   MCHC 34.2   RDW 48.2   PLATELETCT 216   MPV 9.5     Recent Labs     01/26/22 2330   SODIUM 137   POTASSIUM 3.6   CHLORIDE 105   CO2 17*   GLUCOSE 71   BUN 17   CREATININE 0.83   CALCIUM 8.1*     Recent Labs     01/26/22 2330   ASTSGOT 47*   ALTSGPT 19   TBILIRUBIN 2.2*   ALKPHOSPHAT 71   GLOBULIN 2.0   INR 1.44*     Recent Labs     01/26/22 2330   APTT 28.9   INR 1.44*        IMAGING:   DX-CHEST-LIMITED (1 VIEW)   Final Result      1.  Small LEFT pneumothorax, similar to outside prior radiographs   2.  LEFT basilar atelectasis, less likely contusion or other airspace disease      OUTSIDE IMAGES-DX CHEST   Final Result      OUTSIDE IMAGES-CT CHEST   Final Result      OUTSIDE IMAGES-CT CERVICAL SPINE   Final Result      OUTSIDE IMAGES-CT HEAD   Final Result      CT-FOREIGN FILM CAT SCAN   Final Result      OUTSIDE IMAGES-CT HEAD   Final Result      CT-FOREIGN FILM CAT SCAN   Final Result      DX-CHEST-PORTABLE (1 VIEW)    (Results Pending)   DX-CHEST TUBE PLACEMENT LEFT    (Results Pending)       IMPRESSION AND PLAN:  Traumatic pneumothorax- (present on admission)  Assessment & Plan  Small left pneumothorax.   Left chest tube inserted in ICU  Chest tube to 20 cm suction   Supplemental oxygen to maintain O2 saturations greater than 95%  Aggressive pulmonary hygiene. Serial chest radiographs.    Multiple rib fractures  involving four or more ribs- (present on admission)  Assessment & Plan  Fractures of the 3rd, 4th, 5th, and 6th left ribs.  Aggressive multimodal pain management, and pulmonary hygiene.   Serial chest radiographs.    Frequent falls- (present on admission)  Assessment & Plan  PT/OT once cleared by Ortho    BPH (benign prostatic hyperplasia)- (present on admission)  Assessment & Plan  Chronic condition treated with terazosin.  Resumed maintenance medication on admission.    Hyperlipidemia- (present on admission)  Assessment & Plan  Chronic condition treated with simvastatin.  Resumed maintenance medication on admission.    Hypertension- (present on admission)  Assessment & Plan  Chronic condition treated with lisinopril, metoprolol.  Resumed maintenance medication on admission.    Fracture of left hip (HCC)- (present on admission)  Assessment & Plan  Left hip fracture.  Definitive plan pending.  Weight bearing status - Nonweightbearing LLE.  Seth Baeza MD. Orthopedic Surgeon. [facility].    Contraindication to deep vein thrombosis (DVT) prophylaxis- (present on admission)  Assessment & Plan  Prophylactic anticoagulation for thrombotic prevention initially contraindicated secondary to elevated bleeding risk.  1/29 Trauma surveillance venous duplex scanning ordered.    Encounter for screening for COVID-19- (present on admission)  Assessment & Plan  1/27 COVID-19 specimen sent. AIRBORNE & CONTACT/EYE ISOLATION implemented pending final SARS-CoV-2 testing.    Trauma- (present on admission)  Assessment & Plan  Fall from a couch one night prior to presentation.   Found down by a neighbor after 6-7 hrs.  History of frequent falls  Trauma Green Transfer Activation.  Lee Grimaldo MD. Trauma Surgery.      I independently reviewed pertinent clinical lab tests since admission and ordered additional follow up clinical lab tests.  I independently reviewed pertinent radiographic images and the radiologist's  reports since admission and ordered additional follow up radiographic studies.  The details of the available patient records in [facility] (including laboratory tests, culture data, medications, imaging, and other pertinent diagnostic tests) and documentation by consulting physicians (when applicable) were reviewed, summated, and that information was utilized as warranted in today's medical decision making for this patient.  I evaluated the patient's condition at bedside.    The patient is critically injured with severe blunt chest trauma, multiple rib fractures, and traumatic pneumothorax requiring Review of interval medical and surgical history.  Review of current medications and outpatient medication reconciliation.  Review of interval imaging studies and radiologist interpretation.  Management of acute blunt chest trauma, left pneumothorax and left tube thoracostomy.  Management of thrombotic surveillance and risk mitigation.  Management of hypertension.  and management of multi-modal pain regimen. involving high complexity decision making and medically necessary care by providing frequent assessment, manipulation, and support of pulmonary function to prevent further life-threatening deterioration of the patient's condition.     This patient requires continued ICU management and hospital admission.  The patient has impairment of one or more vital organ systems and a high probability of imminent or life-threatening deterioration in condition.     Aggregated critical care time spent evaluating, reassessing, reviewing documentation, providing care, and managing this patient exclusive of procedures: 45 minutes  ____________________________________   Lee Maxwell M.D.     ELIJAH / ANTONIA     DD: 1/27/2022   DT: 2:11 AM

## 2022-01-27 NOTE — PROCEDURES
DATE OF PROCEDURE: 1/27/2022     PRE-PROCEDURE DIAGNOSES: Traumatic left pneumothorax    POST-PROCEDURE DIAGNOSES: Same     PROCEDURE PERFORMED:   Left tube thoracostomy .     PERFORMED BY: Lee Maxwell M.D.      FINDINGS: Release of air upon entering the left pleural space    PROCEDURE: The patient was properly identified and optimally positioned with the arm restrained and padded above the patient's head. Continuous hemodynamic and oxygen saturation monitoring was employed through out the procedure.  Moderate intravenous sedation was administered. The left chest wall was widely prepped and draped into a sterile field.     Local anesthetic was used to infiltrate the chest tube site.  A 3-cm transverse incision was made in the mid-axillary line and the subcutaneous tissue spread bluntly. The thoracic cavity was accessed bluntly over the rib.  A finger was placed through the tract confirming intra-thoracic entry.  A 24 Fr chest tube was directed toward the chest apex and secured to the skin with an 0-Ethibond suture.     The chest tube was connected to closed suction drainage and a sterile chest tube dressing was applied. The patient tolerated the procedure well. There were no apparent complications.  A post-procedural chest x-ray was ordered and showed improvement in the pre-procedural chest x-ray findings.  ____________________________________   Edin Maxwell MD, FACS    DD: 1/27/2022  1:45 AM

## 2022-01-27 NOTE — ASSESSMENT & PLAN NOTE
Chronic condition treated with lisinopril, metoprolol.  Resumed maintenance medication on admission.   1/28 DC metoprolol secondary to bradycardia.

## 2022-01-28 ENCOUNTER — APPOINTMENT (OUTPATIENT)
Dept: RADIOLOGY | Facility: MEDICAL CENTER | Age: 77
DRG: 200 | End: 2022-01-28
Attending: SURGERY
Payer: COMMERCIAL

## 2022-01-28 PROBLEM — Z75.8 DISCHARGE PLANNING ISSUES: Status: ACTIVE | Noted: 2022-01-28

## 2022-01-28 PROBLEM — Z11.52 ENCOUNTER FOR SCREENING FOR COVID-19: Status: RESOLVED | Noted: 2022-01-27 | Resolved: 2022-01-28

## 2022-01-28 PROBLEM — Z02.9 DISCHARGE PLANNING ISSUES: Status: ACTIVE | Noted: 2022-01-28

## 2022-01-28 LAB
ANION GAP SERPL CALC-SCNC: 7 MMOL/L (ref 7–16)
BASOPHILS # BLD AUTO: 0.4 % (ref 0–1.8)
BASOPHILS # BLD: 0.02 K/UL (ref 0–0.12)
BUN SERPL-MCNC: 14 MG/DL (ref 8–22)
CALCIUM SERPL-MCNC: 7.8 MG/DL (ref 8.5–10.5)
CHLORIDE SERPL-SCNC: 109 MMOL/L (ref 96–112)
CO2 SERPL-SCNC: 22 MMOL/L (ref 20–33)
CREAT SERPL-MCNC: 0.7 MG/DL (ref 0.5–1.4)
EOSINOPHIL # BLD AUTO: 0.1 K/UL (ref 0–0.51)
EOSINOPHIL NFR BLD: 2 % (ref 0–6.9)
ERYTHROCYTE [DISTWIDTH] IN BLOOD BY AUTOMATED COUNT: 48.4 FL (ref 35.9–50)
GLUCOSE SERPL-MCNC: 115 MG/DL (ref 65–99)
HCT VFR BLD AUTO: 26.3 % (ref 42–52)
HGB BLD-MCNC: 8.9 G/DL (ref 14–18)
IMM GRANULOCYTES # BLD AUTO: 0.03 K/UL (ref 0–0.11)
IMM GRANULOCYTES NFR BLD AUTO: 0.6 % (ref 0–0.9)
LYMPHOCYTES # BLD AUTO: 0.77 K/UL (ref 1–4.8)
LYMPHOCYTES NFR BLD: 15.2 % (ref 22–41)
MCH RBC QN AUTO: 33 PG (ref 27–33)
MCHC RBC AUTO-ENTMCNC: 33.8 G/DL (ref 33.7–35.3)
MCV RBC AUTO: 97.4 FL (ref 81.4–97.8)
MONOCYTES # BLD AUTO: 0.42 K/UL (ref 0–0.85)
MONOCYTES NFR BLD AUTO: 8.3 % (ref 0–13.4)
NEUTROPHILS # BLD AUTO: 3.74 K/UL (ref 1.82–7.42)
NEUTROPHILS NFR BLD: 73.5 % (ref 44–72)
NRBC # BLD AUTO: 0 K/UL
NRBC BLD-RTO: 0 /100 WBC
PLATELET # BLD AUTO: 186 K/UL (ref 164–446)
PMV BLD AUTO: 9.8 FL (ref 9–12.9)
POTASSIUM SERPL-SCNC: 3.3 MMOL/L (ref 3.6–5.5)
RBC # BLD AUTO: 2.7 M/UL (ref 4.7–6.1)
SODIUM SERPL-SCNC: 138 MMOL/L (ref 135–145)
WBC # BLD AUTO: 5.1 K/UL (ref 4.8–10.8)

## 2022-01-28 PROCEDURE — 99223 1ST HOSP IP/OBS HIGH 75: CPT | Performed by: PHYSICAL MEDICINE & REHABILITATION

## 2022-01-28 PROCEDURE — 94669 MECHANICAL CHEST WALL OSCILL: CPT

## 2022-01-28 PROCEDURE — 71045 X-RAY EXAM CHEST 1 VIEW: CPT

## 2022-01-28 PROCEDURE — 80048 BASIC METABOLIC PNL TOTAL CA: CPT

## 2022-01-28 PROCEDURE — A9270 NON-COVERED ITEM OR SERVICE: HCPCS | Performed by: NURSE PRACTITIONER

## 2022-01-28 PROCEDURE — 700102 HCHG RX REV CODE 250 W/ 637 OVERRIDE(OP): Performed by: SURGERY

## 2022-01-28 PROCEDURE — 700102 HCHG RX REV CODE 250 W/ 637 OVERRIDE(OP): Performed by: NURSE PRACTITIONER

## 2022-01-28 PROCEDURE — 94760 N-INVAS EAR/PLS OXIMETRY 1: CPT

## 2022-01-28 PROCEDURE — 700101 HCHG RX REV CODE 250: Performed by: SURGERY

## 2022-01-28 PROCEDURE — A9270 NON-COVERED ITEM OR SERVICE: HCPCS | Performed by: SURGERY

## 2022-01-28 PROCEDURE — 97166 OT EVAL MOD COMPLEX 45 MIN: CPT

## 2022-01-28 PROCEDURE — 85025 COMPLETE CBC W/AUTO DIFF WBC: CPT

## 2022-01-28 PROCEDURE — 97162 PT EVAL MOD COMPLEX 30 MIN: CPT

## 2022-01-28 PROCEDURE — 700111 HCHG RX REV CODE 636 W/ 250 OVERRIDE (IP): Performed by: SURGERY

## 2022-01-28 PROCEDURE — 700105 HCHG RX REV CODE 258: Performed by: SURGERY

## 2022-01-28 PROCEDURE — 770001 HCHG ROOM/CARE - MED/SURG/GYN PRIV*

## 2022-01-28 RX ORDER — POTASSIUM CHLORIDE 20 MEQ/1
40 TABLET, EXTENDED RELEASE ORAL ONCE
Status: COMPLETED | OUTPATIENT
Start: 2022-01-28 | End: 2022-01-28

## 2022-01-28 RX ADMIN — DOCUSATE SODIUM 100 MG: 100 CAPSULE, LIQUID FILLED ORAL at 05:05

## 2022-01-28 RX ADMIN — GABAPENTIN 100 MG: 100 CAPSULE ORAL at 05:05

## 2022-01-28 RX ADMIN — ENOXAPARIN SODIUM 30 MG: 30 INJECTION SUBCUTANEOUS at 18:27

## 2022-01-28 RX ADMIN — LIDOCAINE 1 PATCH: 50 PATCH TOPICAL at 05:05

## 2022-01-28 RX ADMIN — GABAPENTIN 100 MG: 100 CAPSULE ORAL at 13:40

## 2022-01-28 RX ADMIN — ACETAMINOPHEN 650 MG: 325 TABLET, FILM COATED ORAL at 08:32

## 2022-01-28 RX ADMIN — TRAMADOL HYDROCHLORIDE 50 MG: 50 TABLET, COATED ORAL at 08:32

## 2022-01-28 RX ADMIN — TERAZOSIN HYDROCHLORIDE 5 MG: 5 CAPSULE ORAL at 20:52

## 2022-01-28 RX ADMIN — ACETAMINOPHEN 650 MG: 325 TABLET, FILM COATED ORAL at 18:30

## 2022-01-28 RX ADMIN — SIMVASTATIN 20 MG: 10 TABLET, FILM COATED ORAL at 20:52

## 2022-01-28 RX ADMIN — DOCUSATE SODIUM 100 MG: 100 CAPSULE, LIQUID FILLED ORAL at 18:31

## 2022-01-28 RX ADMIN — GABAPENTIN 100 MG: 100 CAPSULE ORAL at 20:52

## 2022-01-28 RX ADMIN — ACETAMINOPHEN 650 MG: 325 TABLET, FILM COATED ORAL at 13:41

## 2022-01-28 RX ADMIN — LISINOPRIL 10 MG: 5 TABLET ORAL at 05:04

## 2022-01-28 RX ADMIN — METOPROLOL TARTRATE 12.5 MG: 25 TABLET, FILM COATED ORAL at 05:05

## 2022-01-28 RX ADMIN — POTASSIUM CHLORIDE 40 MEQ: 1500 TABLET, EXTENDED RELEASE ORAL at 08:54

## 2022-01-28 RX ADMIN — SODIUM CHLORIDE, POTASSIUM CHLORIDE, SODIUM LACTATE AND CALCIUM CHLORIDE: 600; 310; 30; 20 INJECTION, SOLUTION INTRAVENOUS at 03:45

## 2022-01-28 RX ADMIN — POLYETHYLENE GLYCOL 3350 1 PACKET: 17 POWDER, FOR SOLUTION ORAL at 18:29

## 2022-01-28 ASSESSMENT — COGNITIVE AND FUNCTIONAL STATUS - GENERAL
CLIMB 3 TO 5 STEPS WITH RAILING: TOTAL
EATING MEALS: A LITTLE
DRESSING REGULAR UPPER BODY CLOTHING: A LITTLE
CLIMB 3 TO 5 STEPS WITH RAILING: A LOT
HELP NEEDED FOR BATHING: A LITTLE
DRESSING REGULAR LOWER BODY CLOTHING: A LITTLE
WALKING IN HOSPITAL ROOM: A LOT
SUGGESTED CMS G CODE MODIFIER MOBILITY: CK
MOVING TO AND FROM BED TO CHAIR: A LITTLE
SUGGESTED CMS G CODE MODIFIER DAILY ACTIVITY: CK
MOVING FROM LYING ON BACK TO SITTING ON SIDE OF FLAT BED: A LITTLE
DRESSING REGULAR LOWER BODY CLOTHING: A LOT
DAILY ACTIVITIY SCORE: 17
WALKING IN HOSPITAL ROOM: A LITTLE
HELP NEEDED FOR BATHING: A LITTLE
DAILY ACTIVITIY SCORE: 20
MOBILITY SCORE: 17
TURNING FROM BACK TO SIDE WHILE IN FLAT BAD: A LITTLE
MOVING FROM LYING ON BACK TO SITTING ON SIDE OF FLAT BED: UNABLE
SUGGESTED CMS G CODE MODIFIER MOBILITY: CM
STANDING UP FROM CHAIR USING ARMS: A LOT
TOILETING: A LITTLE
MOVING TO AND FROM BED TO CHAIR: UNABLE
TOILETING: A LITTLE
STANDING UP FROM CHAIR USING ARMS: A LITTLE
MOBILITY SCORE: 9
DRESSING REGULAR UPPER BODY CLOTHING: A LITTLE
SUGGESTED CMS G CODE MODIFIER DAILY ACTIVITY: CJ
PERSONAL GROOMING: A LITTLE
TURNING FROM BACK TO SIDE WHILE IN FLAT BAD: A LOT

## 2022-01-28 ASSESSMENT — FIBROSIS 4 INDEX
FIB4 SCORE: 4.52
FIB4 SCORE: 4.52

## 2022-01-28 ASSESSMENT — LIFESTYLE VARIABLES
TOTAL SCORE: 0
HOW MANY TIMES IN THE PAST YEAR HAVE YOU HAD 5 OR MORE DRINKS IN A DAY: 0
EVER FELT BAD OR GUILTY ABOUT YOUR DRINKING: NO
EVER HAD A DRINK FIRST THING IN THE MORNING TO STEADY YOUR NERVES TO GET RID OF A HANGOVER: NO
TOTAL SCORE: 0
CONSUMPTION TOTAL: NEGATIVE
ON A TYPICAL DAY WHEN YOU DRINK ALCOHOL HOW MANY DRINKS DO YOU HAVE: 0
DOES PATIENT WANT TO TALK TO SOMEONE ABOUT QUITTING: NO
ALCOHOL_USE: NO
HAVE YOU EVER FELT YOU SHOULD CUT DOWN ON YOUR DRINKING: NO
AVERAGE NUMBER OF DAYS PER WEEK YOU HAVE A DRINK CONTAINING ALCOHOL: 0
DOES PATIENT WANT TO STOP DRINKING: NO
HAVE PEOPLE ANNOYED YOU BY CRITICIZING YOUR DRINKING: NO
TOTAL SCORE: 0

## 2022-01-28 ASSESSMENT — ENCOUNTER SYMPTOMS
BACK PAIN: 0
MYALGIAS: 1
NEUROLOGICAL NEGATIVE: 1
SHORTNESS OF BREATH: 1
NECK PAIN: 0
CARDIOVASCULAR NEGATIVE: 1
GASTROINTESTINAL NEGATIVE: 1
EYES NEGATIVE: 1
CONSTITUTIONAL NEGATIVE: 1

## 2022-01-28 ASSESSMENT — PAIN DESCRIPTION - PAIN TYPE: TYPE: ACUTE PAIN

## 2022-01-28 ASSESSMENT — GAIT ASSESSMENTS
ASSISTIVE DEVICE: FRONT WHEEL WALKER
DISTANCE (FEET): 5
GAIT LEVEL OF ASSIST: MODERATE ASSIST

## 2022-01-28 ASSESSMENT — ACTIVITIES OF DAILY LIVING (ADL): TOILETING: INDEPENDENT
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
## 2022-01-28 NOTE — DOCUMENTATION QUERY
On license of [facility]                                                                       Query Response Note      PATIENT:               ALFREDO WILSON  ACCT #:                  [id number]  MRN:                     [id number]  :                      1945  ADMIT DATE:       2022 11:26 PM  DISCH DATE:          RESPONDING  PROVIDER #:        [id number]           QUERY TEXT:    A K lab value of 3.3 is noted in the Medical Record.  Can a diagnosis be specified to support this finding?    NOTE:  If an appropriate response is not listed below, please respond with a new note.    The patient's Clinical Indicators include:  Patient admitted s/p fall with left PTX, left sided rib fractures.  K level 3.3 on   TX: 40meq KCL po x 1    Thank you,  Masha Diaz RN, CCDS  Clinical   Connect via Voalte  Options provided:   -- Hypokalemia   -- Findings are clinically insignificant   -- Unable to determine      Query created by: Masha Diaz on 2022 12:09 PM    RESPONSE TEXT:    Hypokalemia          Electronically signed by:  JING SINGLETON 2022 1:55 PM

## 2022-01-28 NOTE — CARE PLAN
Problem: Knowledge Deficit - Standard  Goal: Patient and family/care givers will demonstrate understanding of plan of care, disease process/condition, diagnostic tests and medications  Outcome: Progressing     Problem: Skin Integrity  Goal: Skin integrity is maintained or improved  Outcome: Progressing     Problem: Fall Risk  Goal: Patient will remain free from falls  Outcome: Progressing       The patient is Stable    Shift Goals  Clinical Goals: stable vital signs  Patient Goals: eat  Family Goals: n/a    Progress made toward(s) clinical / shift goals:  Vital signs have remained stable.    Patient is not progressing towards the following goals:

## 2022-01-28 NOTE — DISCHARGE PLANNING
Care Transition Team Discharge Planning    Anticipates discharge disposition:  • SNF vs Home    Action:    • Per EMELYN BEJARANO, Pt's Friend/Michael was at bedside this pm and Michael is concerned about Pt''s upcoming bills while Pt is in the hospital.    Tried to contact Michael but his number is not working.    Michael is POA for Health as filed in Epic.   Informed EMELYN BEJARANO that CM assist with POA for Health and not with Finance.    Admissions at [facility] is now following the case.    Admissions at [facility] declined Pt and the recommendation is SNF.  Will obtain SNF choice.   Informed Jason UDMONT to put in a SNF referral.     This RN CM met with Pt at bedside. Per Pt, Michael is his POA for Health and Finance.    Per Pt he owns a FWW and Michael takes him to his appointments.  Pt has a left chest tube.    Explained that Physiatry's recommendation is SNF but Pt refused to discharge to a SNF and he refused for CM to send SNF referral.    Per Pt he prefers to discahrge to home and that Michael can provide care and support. Informed Jason DUMONT via voalte.     Barriers to Discharge:  • Pending medical clearance  • Pending Pt;s discharge disposition    Plan:  • CM to continue to assist Pt with discharge as needed
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
## 2022-01-28 NOTE — ASSESSMENT & PLAN NOTE
Date of admission: 1/26/2022.  1/28 Transfer orders from SICU.  1/28 Rehab recommending SNF. Patient has been accepted to a few SNFs. Waiting on bed availability.  2/2 SNF will have a bed available Monday, 2/7.  2/3 Potential bed available tomorrow, Covid test ordered.  Cleared for discharge: Yes - Date: 2/1.   Discharge delayed: Yes - Reason: Bed availability.  Discharge date: 2/4

## 2022-01-28 NOTE — DISCHARGE PLANNING
Physiatry recommending SNF level of care. TCC will no longer follow, please call with any questions [phone].

## 2022-01-28 NOTE — PROGRESS NOTES
Trauma / Surgical Daily Progress Note    Date of Service  1/27/2022    Chief Complaint  76 y.o. male admitted 1/26/2022 with Trauma - GLF    Interval Events    Patient awake and cooperative  Doing incentive spirometry volumes approximate 500 cc  Tolerating regular diet  Chest tube output ~ 170 cc /no air leak present  Out of bed to a chair  PT OT eval as frequent falls  Extensive bruising noted on the left flank buttock and leg    Review of Systems  Review of Systems   Cardiovascular: Positive for chest pain (Left chest wall).   All other systems reviewed and are negative.       Vital Signs for last 24 hours  Temp:  [36.7 °C (98 °F)] 36.7 °C (98 °F)  Pulse:  [61-88] 70  Resp:  [10-31] 15  BP: (138-174)/(59-89) 159/89  SpO2:  [96 %-100 %] 96 %    Hemodynamic parameters for last 24 hours       Respiratory Data     Respiration: 15, Pulse Oximetry: 96 %     Work Of Breathing / Effort: Shallow  RUL Breath Sounds: Clear, RML Breath Sounds: Clear, RLL Breath Sounds: Diminished, AMY Breath Sounds: Diminished, LLL Breath Sounds: Diminished    Physical Exam  Physical Exam  Vitals and nursing note reviewed.   Constitutional:       Appearance: He is normal weight.   HENT:      Head: Normocephalic.      Right Ear: External ear normal.      Left Ear: External ear normal.      Nose: Nose normal.      Mouth/Throat:      Mouth: Mucous membranes are moist.   Eyes:      General: No scleral icterus.     Pupils: Pupils are equal, round, and reactive to light.   Cardiovascular:      Rate and Rhythm: Normal rate and regular rhythm.      Pulses: Normal pulses.   Pulmonary:      Effort: Pulmonary effort is normal. No respiratory distress.      Breath sounds: No stridor. No rhonchi.      Comments: Left chest tube -170 cc bloody drainage /no air leak  Chest:      Chest wall: Tenderness (Left chesrt wall) present.   Abdominal:      General: Abdomen is flat. Bowel sounds are normal. There is no distension.      Tenderness: There is  abdominal tenderness. There is rebound. There is no guarding.   Genitourinary:     Comments: Maya to gravity.  Musculoskeletal:         General: No swelling or tenderness. Normal range of motion.      Cervical back: Normal range of motion and neck supple.   Skin:     General: Skin is warm and dry.      Capillary Refill: Capillary refill takes 2 to 3 seconds.   Neurological:      Mental Status: He is alert and oriented to person, place, and time.         Laboratory  Recent Results (from the past 24 hour(s))   DIAGNOSTIC ALCOHOL    Collection Time: 01/26/22 11:30 PM   Result Value Ref Range    Diagnostic Alcohol <10.1 0.0 - 10.0 mg/dL   CBC WITHOUT DIFFERENTIAL    Collection Time: 01/26/22 11:30 PM   Result Value Ref Range    WBC 8.1 4.8 - 10.8 K/uL    RBC 2.93 (L) 4.70 - 6.10 M/uL    Hemoglobin 9.7 (L) 14.0 - 18.0 g/dL    Hematocrit 28.4 (L) 42.0 - 52.0 %    MCV 96.9 81.4 - 97.8 fL    MCH 33.1 (H) 27.0 - 33.0 pg    MCHC 34.2 33.7 - 35.3 g/dL    RDW 48.2 35.9 - 50.0 fL    Platelet Count 216 164 - 446 K/uL    MPV 9.5 9.0 - 12.9 fL   Comp Metabolic Panel    Collection Time: 01/26/22 11:30 PM   Result Value Ref Range    Sodium 137 135 - 145 mmol/L    Potassium 3.6 3.6 - 5.5 mmol/L    Chloride 105 96 - 112 mmol/L    Co2 17 (L) 20 - 33 mmol/L    Anion Gap 15.0 7.0 - 16.0    Glucose 71 65 - 99 mg/dL    Bun 17 8 - 22 mg/dL    Creatinine 0.83 0.50 - 1.40 mg/dL    Calcium 8.1 (L) 8.5 - 10.5 mg/dL    AST(SGOT) 47 (H) 12 - 45 U/L    ALT(SGPT) 19 2 - 50 U/L    Alkaline Phosphatase 71 30 - 99 U/L    Total Bilirubin 2.2 (H) 0.1 - 1.5 mg/dL    Albumin 2.9 (L) 3.2 - 4.9 g/dL    Total Protein 4.9 (L) 6.0 - 8.2 g/dL    Globulin 2.0 1.9 - 3.5 g/dL    A-G Ratio 1.5 g/dL   Prothrombin Time    Collection Time: 01/26/22 11:30 PM   Result Value Ref Range    PT 17.1 (H) 12.0 - 14.6 sec    INR 1.44 (H) 0.87 - 1.13   APTT    Collection Time: 01/26/22 11:30 PM   Result Value Ref Range    APTT 28.9 24.7 - 36.0 sec   CREATINE KINASE    Collection  Time: 01/26/22 11:30 PM   Result Value Ref Range    CPK Total 1008 (H) 0 - 154 U/L   TROPONIN    Collection Time: 01/26/22 11:30 PM   Result Value Ref Range    Troponin T 53 (H) 6 - 19 ng/L   COD - Adult (Type and Screen)    Collection Time: 01/26/22 11:30 PM   Result Value Ref Range    ABO Grouping Only A     Rh Grouping Only POS     Antibody Screen-Cod NEG    ESTIMATED GFR    Collection Time: 01/26/22 11:30 PM   Result Value Ref Range    GFR If African American >60 >60 mL/min/1.73 m 2    GFR If Non African American >60 >60 mL/min/1.73 m 2   RETICULOCYTES COUNT    Collection Time: 01/26/22 11:30 PM   Result Value Ref Range    Reticulocyte Count 3.2 (H) 0.8 - 2.1 %    Retic, Absolute 0.09 (H) 0.04 - 0.06 M/uL    Imm. Reticulocyte Fraction 20.7 (H) 9.3 - 17.4 %    Retic Hgb Equivalent 35.9 (H) 29.0 - 35.0 pg/cell   IRON/TOTAL IRON BIND    Collection Time: 01/26/22 11:30 PM   Result Value Ref Range    Iron 42 (L) 50 - 180 ug/dL    Total Iron Binding 189 (L) 250 - 450 ug/dL    Unsat Iron Binding 147 110 - 370 ug/dL    % Saturation 22 15 - 55 %   ABO Rh Confirm    Collection Time: 01/26/22 11:56 PM   Result Value Ref Range    ABO Rh Confirm A POS    CREATINE KINASE    Collection Time: 01/27/22 12:30 PM   Result Value Ref Range    CPK Total 573 (H) 0 - 154 U/L   SARS-COV Antigen TOÑO: Collect dry Nasal swab    Collection Time: 01/27/22  5:41 PM   Result Value Ref Range    SARS-CoV-2 Source Nasal Swab     SARS-COV ANTIGEN TOÑO NotDetected NotDetected       Fluids    Intake/Output Summary (Last 24 hours) at 1/27/2022 1845  Last data filed at 1/27/2022 1800  Gross per 24 hour   Intake 3256.25 ml   Output 1395 ml   Net 1861.25 ml       Core Measures & Quality Metrics  Radiology images reviewed, Labs reviewed and Medications reviewed  Maya catheter: Critically Ill - Requiring Accurate Measurement of Urinary Output      DVT Prophylaxis: Enoxaparin (Lovenox)  DVT prophylaxis - mechanical: SCDs  Ulcer prophylaxis: Not  indicated        GENIA Score  ETOH Screening    Assessment/Plan  Traumatic pneumothorax- (present on admission)  Assessment & Plan  Small left pneumothorax.   Left chest tube inserted in ICU  Chest tube to 20 cm suction   Supplemental oxygen to maintain O2 saturations greater than 95%  Aggressive pulmonary hygiene. Serial chest radiographs.    Multiple rib fractures involving four or more ribs- (present on admission)  Assessment & Plan  Fractures of the 3rd, 4th, 5th, and 6th left ribs.  Aggressive multimodal pain management, and pulmonary hygiene.   Serial chest radiographs.    Frequent falls- (present on admission)  Assessment & Plan  PT/OT once cleared by Ortho    BPH (benign prostatic hyperplasia)- (present on admission)  Assessment & Plan  Chronic condition treated with terazosin.  Resumed maintenance medication on admission.    Hyperlipidemia- (present on admission)  Assessment & Plan  Chronic condition treated with simvastatin.  Resumed maintenance medication on admission.    Hypertension- (present on admission)  Assessment & Plan  Chronic condition treated with lisinopril, metoprolol.  Resumed maintenance medication on admission.    Left hip pain- (present on admission)  Assessment & Plan  Report of left hip fracture[facility]  No fracture seen on review by Dr Baeza.  Weight bearing status - Weightbearing as tolerated   Seth Baeza MD. Orthopedic Surgeon. [facility].    No contraindication to deep vein thrombosis (DVT) prophylaxis- (present on admission)  Assessment & Plan  Prophylactic dose enoxaparin initiated upon admission.    Encounter for screening for COVID-19- (present on admission)  Assessment & Plan  Admission SARS-CoV-2 testing negative. Repeat SARS-CoV-2 testing not indicated. Isolation precautions de-escalated.    Trauma- (present on admission)  Assessment & Plan  Fall from a couch one night prior to presentation.   Found down by a neighbor after 6-7 hrs.  History of  frequent falls  Trauma Green Transfer Activation.  Lee Grimaldo MD. Trauma Surgery.      Discussed patient condition with RN, RT, Therapies, Pharmacy, Dietary, , Patient and orthopedics and trauma surgery.  CRITICAL CARE TIME EXCLUDING PROCEDURES: 40  minutes

## 2022-01-28 NOTE — PROGRESS NOTES
Trauma / Surgical Daily Progress Note    Date of Service  1/28/2022    Chief Complaint  76 y.o. male admitted 1/26/2022 post ground level fall. He sustained 4 left sided rib fractures, a left pneumothorax, and left hip injury.     PO procedure day # 1 Left tube thoracostomy     Interval Events    No critical events overnight.   No pneumothorax on AM CXR.  Chest tube to suction with no air leak.   Adequate pain control.  IS 1750 cc.  Bradycardia on monitor.    - Continue chest tube to suction. Trend CXR imaging.  - Continue aggressive pulmonary hygiene and multimodal pain management.  - DC Beta Blocker.  Trend vital signs.   - PT/OT   - Clinically stable at this time, transfer to [facility]  - Disposition: Physiatry consult placed/  - Counseled     Review of Systems  Review of Systems   Constitutional: Negative.    HENT: Negative.    Eyes: Negative.    Respiratory: Positive for shortness of breath.    Cardiovascular: Negative.    Gastrointestinal: Negative.    Genitourinary: Negative.    Musculoskeletal: Positive for myalgias. Negative for back pain, joint pain and neck pain.   Skin: Negative.    Neurological: Negative.         Vital Signs  Pulse:  [51-76] 59  Resp:  [14-31] 14  BP: (116-174)/(56-89) 123/63  SpO2:  [94 %-100 %] 98 %    Physical Exam  Physical Exam  Vitals and nursing note reviewed.   Constitutional:       General: He is awake. He is not in acute distress.     Appearance: He is not ill-appearing, toxic-appearing or diaphoretic.   HENT:      Head: Normocephalic.      Right Ear: External ear normal.      Left Ear: External ear normal.      Nose: Nose normal.      Mouth/Throat:      Mouth: Mucous membranes are moist.      Pharynx: Oropharynx is clear.   Eyes:      General: No scleral icterus.     Extraocular Movements: Extraocular movements intact.      Conjunctiva/sclera: Conjunctivae normal.   Cardiovascular:      Rate and Rhythm: Normal rate and regular rhythm.      Pulses: Normal pulses.   Pulmonary:       Effort: Pulmonary effort is normal. No respiratory distress.      Breath sounds: No stridor. No rhonchi.      Comments: Left chest tube -220 cc serosang drainage in 24 hrs /no air leak  Chest:      Chest wall: Tenderness (Left chesrt wall) present.   Abdominal:      General: Abdomen is flat. Bowel sounds are normal. There is no distension.      Tenderness: There is abdominal tenderness. There is rebound. There is no guarding.   Genitourinary:     Comments: Maya to gravity.  Musculoskeletal:         General: No swelling or tenderness. Normal range of motion.      Cervical back: Normal range of motion and neck supple.   Skin:     General: Skin is warm and dry.      Capillary Refill: Capillary refill takes 2 to 3 seconds.   Neurological:      Mental Status: He is alert.      GCS: GCS eye subscore is 4. GCS verbal subscore is 5. GCS motor subscore is 6.   Psychiatric:         Behavior: Behavior is cooperative.         Laboratory  Recent Results (from the past 24 hour(s))   CREATINE KINASE    Collection Time: 01/27/22 12:30 PM   Result Value Ref Range    CPK Total 573 (H) 0 - 154 U/L   SARS-COV Antigen TOÑO: Collect dry Nasal swab    Collection Time: 01/27/22  5:41 PM   Result Value Ref Range    SARS-CoV-2 Source Nasal Swab     SARS-COV ANTIGEN TOÑO NotDetected NotDetected   CBC WITH DIFFERENTIAL    Collection Time: 01/28/22  4:32 AM   Result Value Ref Range    WBC 5.1 4.8 - 10.8 K/uL    RBC 2.70 (L) 4.70 - 6.10 M/uL    Hemoglobin 8.9 (L) 14.0 - 18.0 g/dL    Hematocrit 26.3 (L) 42.0 - 52.0 %    MCV 97.4 81.4 - 97.8 fL    MCH 33.0 27.0 - 33.0 pg    MCHC 33.8 33.7 - 35.3 g/dL    RDW 48.4 35.9 - 50.0 fL    Platelet Count 186 164 - 446 K/uL    MPV 9.8 9.0 - 12.9 fL    Neutrophils-Polys 73.50 (H) 44.00 - 72.00 %    Lymphocytes 15.20 (L) 22.00 - 41.00 %    Monocytes 8.30 0.00 - 13.40 %    Eosinophils 2.00 0.00 - 6.90 %    Basophils 0.40 0.00 - 1.80 %    Immature Granulocytes 0.60 0.00 - 0.90 %    Nucleated RBC 0.00 /100 WBC     Neutrophils (Absolute) 3.74 1.82 - 7.42 K/uL    Lymphs (Absolute) 0.77 (L) 1.00 - 4.80 K/uL    Monos (Absolute) 0.42 0.00 - 0.85 K/uL    Eos (Absolute) 0.10 0.00 - 0.51 K/uL    Baso (Absolute) 0.02 0.00 - 0.12 K/uL    Immature Granulocytes (abs) 0.03 0.00 - 0.11 K/uL    NRBC (Absolute) 0.00 K/uL   Basic Metabolic Panel    Collection Time: 01/28/22  4:32 AM   Result Value Ref Range    Sodium 138 135 - 145 mmol/L    Potassium 3.3 (L) 3.6 - 5.5 mmol/L    Chloride 109 96 - 112 mmol/L    Co2 22 20 - 33 mmol/L    Glucose 115 (H) 65 - 99 mg/dL    Bun 14 8 - 22 mg/dL    Creatinine 0.70 0.50 - 1.40 mg/dL    Calcium 7.8 (L) 8.5 - 10.5 mg/dL    Anion Gap 7.0 7.0 - 16.0   ESTIMATED GFR    Collection Time: 01/28/22  4:32 AM   Result Value Ref Range    GFR If African American >60 >60 mL/min/1.73 m 2    GFR If Non African American >60 >60 mL/min/1.73 m 2       Fluids    Intake/Output Summary (Last 24 hours) at 1/28/2022 0836  Last data filed at 1/28/2022 0800  Gross per 24 hour   Intake 1800 ml   Output 1275 ml   Net 525 ml       Core Measures & Quality Metrics  Radiology images reviewed, Labs reviewed and Medications reviewed  Maya catheter: Critically Ill - Requiring Accurate Measurement of Urinary Output      DVT Prophylaxis: Enoxaparin (Lovenox)  DVT prophylaxis - mechanical: SCDs  Ulcer prophylaxis: Not indicated    Assessed for rehab: Patient was assess for and/or received rehabilitation services during this hospitalization    RAP Score Total: 6    Assesment ETOH: CAGE not completed. Admission diagnostic alcohol < 10.1  Denies alcohol abuse.         Assessment/Plan  Traumatic pneumothorax- (present on admission)  Assessment & Plan  Small left pneumothorax.   Left chest tube inserted in ICU  1/28 No pneumothorax on AM CXR.  Chest tube to suction with no air leak. Total in pleura vac 390 cc. Continue chest tube to 20 cm suction   Supplemental oxygen to maintain O2 saturations greater than 95%  Aggressive pulmonary hygiene.  Serial chest radiographs.     Multiple rib fractures involving four or more ribs- (present on admission)  Assessment & Plan  Fractures of the 3rd, 4th, 5th, and 6th left ribs.  Aggressive multimodal pain management, and pulmonary hygiene.   Serial chest radiographs.     Discharge planning issues- (present on admission)  Assessment & Plan  Date of admission: 1/26/2022.  1/28/2022 Transfer orders from [facility].  1/28/2022 Rehab referral referral.  Cleared for discharge: No.  Discharge delayed: No.  Discharge date: tbd.     Frequent falls- (present on admission)  Assessment & Plan  Physical and occupational therapy.     BPH (benign prostatic hyperplasia)- (present on admission)  Assessment & Plan  Chronic condition treated with terazosin.  Resumed maintenance medication on admission.     Hyperlipidemia- (present on admission)  Assessment & Plan  Chronic condition treated with simvastatin.  Resumed maintenance medication on admission.    Hypertension- (present on admission)  Assessment & Plan  Chronic condition treated with lisinopril, metoprolol.  Resumed maintenance medication on admission.   1/28 DC metoprolol secondary to bradycardia.    Left hip pain- (present on admission)  Assessment & Plan  Report of left hip fracture.  [facility]  No fracture seen on review by Dr Baeza.  Weight bearing status - Weightbearing as tolerated   Seth Baeza MD. Orthopedic Surgeon. [facility].      No contraindication to deep vein thrombosis (DVT) prophylaxis- (present on admission)  Assessment & Plan  Prophylactic dose enoxaparin initiated upon admission.     Trauma- (present on admission)  Assessment & Plan  Fall from a couch one night prior to presentation.   Found down by a neighbor after 6-7 hrs.  History of frequent falls  Trauma Green Transfer Activation.  Lee Grimaldo MD. Trauma Surgery.          IMAGING:  DX-CHEST-PORTABLE (1 VIEW)   Final Result      No significant interval change.       DX-CHEST-PORTABLE (1 VIEW)   Final Result      1.  No visible residual LEFT pneumothorax with chest tube in place   2.  Ongoing LEFT basilar atelectasis      DX-CHEST-LIMITED (1 VIEW)   Final Result      1.  No visible residual LEFT pneumothorax with chest tube in place   2.  Ongoing LEFT basilar atelectasis      DX-CHEST-LIMITED (1 VIEW)   Final Result      1.  Small LEFT pneumothorax, similar to outside prior radiographs   2.  LEFT basilar atelectasis, less likely contusion or other airspace disease      OUTSIDE IMAGES-DX CHEST   Final Result      OUTSIDE IMAGES-CT CHEST   Final Result      OUTSIDE IMAGES-CT CERVICAL SPINE   Final Result      OUTSIDE IMAGES-CT HEAD   Final Result      CT-FOREIGN FILM CAT SCAN   Final Result      OUTSIDE IMAGES-CT HEAD   Final Result      CT-FOREIGN FILM CAT SCAN   Final Result          All current laboratory studies/radiology exams reviewed: Yes    Completed Consultations:  None     Pending Consultations:  None    Newly Identified Diagnoses and Injuries:  None at this time.  Continued assessment at patient begins to mobilize.            Discussed patient condition with Patient and trauma surgery, Dr. Ger Sood.

## 2022-01-28 NOTE — CARE PLAN
The patient is Watcher - Medium risk of patient condition declining or worsening    Shift Goals  Clinical Goals: stable vital signs  Patient Goals: eat  Family Goals: n/a    Progress made toward(s) clinical / shift goals:  diet ordered    Patient is not progressing towards the following goals: going home

## 2022-01-28 NOTE — DISCHARGE PLANNING
Received a VM from a [facility] , Pearl ([phone]: [phone]) wanting to discuss patient. LMSW returned Pearl's call however no answer so a detailed VM was left providing her with contact number for a returned call.     RADHA then called Ysabel with the [facility] ([phone]) and LM inquiring if patient is service connected. Provided contact number for a returned call.     Therapy's have been ordered as patient has frequent falls.   PMR ordered as well     Plan: Follow up with treatment team regarding dc needs once known     Addendum 1117: [facility] with [facility] returned call and reports that patient is not service connected so he is not eligible for their SNF or long term coordination plan. Patient does have Medicare A and B that can be used for post acute placement if needed.

## 2022-01-29 ENCOUNTER — APPOINTMENT (OUTPATIENT)
Dept: RADIOLOGY | Facility: MEDICAL CENTER | Age: 77
DRG: 200 | End: 2022-01-29
Attending: SURGERY
Payer: COMMERCIAL

## 2022-01-29 LAB
ANION GAP SERPL CALC-SCNC: 6 MMOL/L (ref 7–16)
BASOPHILS # BLD AUTO: 0.4 % (ref 0–1.8)
BASOPHILS # BLD: 0.03 K/UL (ref 0–0.12)
BUN SERPL-MCNC: 17 MG/DL (ref 8–22)
CALCIUM SERPL-MCNC: 8.2 MG/DL (ref 8.5–10.5)
CHLORIDE SERPL-SCNC: 107 MMOL/L (ref 96–112)
CO2 SERPL-SCNC: 24 MMOL/L (ref 20–33)
CREAT SERPL-MCNC: 0.99 MG/DL (ref 0.5–1.4)
EOSINOPHIL # BLD AUTO: 0.12 K/UL (ref 0–0.51)
EOSINOPHIL NFR BLD: 1.7 % (ref 0–6.9)
ERYTHROCYTE [DISTWIDTH] IN BLOOD BY AUTOMATED COUNT: 51.8 FL (ref 35.9–50)
GLUCOSE SERPL-MCNC: 115 MG/DL (ref 65–99)
HCT VFR BLD AUTO: 28.9 % (ref 42–52)
HGB BLD-MCNC: 9.5 G/DL (ref 14–18)
IMM GRANULOCYTES # BLD AUTO: 0.03 K/UL (ref 0–0.11)
IMM GRANULOCYTES NFR BLD AUTO: 0.4 % (ref 0–0.9)
LYMPHOCYTES # BLD AUTO: 0.87 K/UL (ref 1–4.8)
LYMPHOCYTES NFR BLD: 12.3 % (ref 22–41)
MCH RBC QN AUTO: 32.5 PG (ref 27–33)
MCHC RBC AUTO-ENTMCNC: 32.9 G/DL (ref 33.7–35.3)
MCV RBC AUTO: 99 FL (ref 81.4–97.8)
MONOCYTES # BLD AUTO: 0.47 K/UL (ref 0–0.85)
MONOCYTES NFR BLD AUTO: 6.6 % (ref 0–13.4)
NEUTROPHILS # BLD AUTO: 5.56 K/UL (ref 1.82–7.42)
NEUTROPHILS NFR BLD: 78.6 % (ref 44–72)
NRBC # BLD AUTO: 0 K/UL
NRBC BLD-RTO: 0 /100 WBC
PLATELET # BLD AUTO: 204 K/UL (ref 164–446)
PMV BLD AUTO: 9.8 FL (ref 9–12.9)
POTASSIUM SERPL-SCNC: 4.3 MMOL/L (ref 3.6–5.5)
RBC # BLD AUTO: 2.92 M/UL (ref 4.7–6.1)
SODIUM SERPL-SCNC: 137 MMOL/L (ref 135–145)
WBC # BLD AUTO: 7.1 K/UL (ref 4.8–10.8)

## 2022-01-29 PROCEDURE — 85025 COMPLETE CBC W/AUTO DIFF WBC: CPT

## 2022-01-29 PROCEDURE — 700102 HCHG RX REV CODE 250 W/ 637 OVERRIDE(OP): Performed by: SURGERY

## 2022-01-29 PROCEDURE — 99232 SBSQ HOSP IP/OBS MODERATE 35: CPT | Performed by: SURGERY

## 2022-01-29 PROCEDURE — 71045 X-RAY EXAM CHEST 1 VIEW: CPT

## 2022-01-29 PROCEDURE — 80048 BASIC METABOLIC PNL TOTAL CA: CPT

## 2022-01-29 PROCEDURE — 700101 HCHG RX REV CODE 250: Performed by: SURGERY

## 2022-01-29 PROCEDURE — A9270 NON-COVERED ITEM OR SERVICE: HCPCS | Performed by: SURGERY

## 2022-01-29 PROCEDURE — A9270 NON-COVERED ITEM OR SERVICE: HCPCS | Performed by: NURSE PRACTITIONER

## 2022-01-29 PROCEDURE — 700111 HCHG RX REV CODE 636 W/ 250 OVERRIDE (IP): Performed by: SURGERY

## 2022-01-29 PROCEDURE — 770001 HCHG ROOM/CARE - MED/SURG/GYN PRIV*

## 2022-01-29 PROCEDURE — 94669 MECHANICAL CHEST WALL OSCILL: CPT

## 2022-01-29 PROCEDURE — 36415 COLL VENOUS BLD VENIPUNCTURE: CPT

## 2022-01-29 PROCEDURE — 700102 HCHG RX REV CODE 250 W/ 637 OVERRIDE(OP): Performed by: NURSE PRACTITIONER

## 2022-01-29 RX ADMIN — DOCUSATE SODIUM 100 MG: 100 CAPSULE, LIQUID FILLED ORAL at 18:18

## 2022-01-29 RX ADMIN — POLYETHYLENE GLYCOL 3350 1 PACKET: 17 POWDER, FOR SOLUTION ORAL at 18:18

## 2022-01-29 RX ADMIN — GABAPENTIN 100 MG: 100 CAPSULE ORAL at 04:32

## 2022-01-29 RX ADMIN — GABAPENTIN 100 MG: 100 CAPSULE ORAL at 13:46

## 2022-01-29 RX ADMIN — TERAZOSIN HYDROCHLORIDE 5 MG: 5 CAPSULE ORAL at 20:41

## 2022-01-29 RX ADMIN — GABAPENTIN 100 MG: 100 CAPSULE ORAL at 20:41

## 2022-01-29 RX ADMIN — ENOXAPARIN SODIUM 30 MG: 30 INJECTION SUBCUTANEOUS at 18:18

## 2022-01-29 RX ADMIN — ACETAMINOPHEN 650 MG: 325 TABLET, FILM COATED ORAL at 18:18

## 2022-01-29 RX ADMIN — SIMVASTATIN 20 MG: 10 TABLET, FILM COATED ORAL at 20:41

## 2022-01-29 RX ADMIN — ACETAMINOPHEN 650 MG: 325 TABLET, FILM COATED ORAL at 11:59

## 2022-01-29 RX ADMIN — ENOXAPARIN SODIUM 30 MG: 30 INJECTION SUBCUTANEOUS at 04:32

## 2022-01-29 RX ADMIN — LIDOCAINE 1 PATCH: 50 PATCH TOPICAL at 04:32

## 2022-01-29 RX ADMIN — LISINOPRIL 10 MG: 5 TABLET ORAL at 04:32

## 2022-01-29 ASSESSMENT — PAIN DESCRIPTION - PAIN TYPE
TYPE: ACUTE PAIN

## 2022-01-29 ASSESSMENT — ENCOUNTER SYMPTOMS
BACK PAIN: 0
CONSTITUTIONAL NEGATIVE: 1
EYES NEGATIVE: 1
NEUROLOGICAL NEGATIVE: 1
GASTROINTESTINAL NEGATIVE: 1
NECK PAIN: 0
SHORTNESS OF BREATH: 0
CARDIOVASCULAR NEGATIVE: 1
MYALGIAS: 1

## 2022-01-29 NOTE — CARE PLAN
The patient is Stable - Low risk of patient condition declining or worsening    Shift Goals  Clinical Goals: chest tube mantiance, rest, stable vitals, safety  Patient Goals: rest and pain control  Family Goals: n/a    Progress made toward(s) clinical / shift goals:  Pt is resting comfortably in bed. Pain is being controlled with PRN and scheduled medications. Pt is able to utilize 0-10 pain scale. Proper fall precautions in place. Call light within reach and encouraged to use. Hourly rounding in practice. Bed alarm in use. Chest tube to suction, dressing CDI, no signs of air leaks.    Patient is not progressing towards the following goals:

## 2022-01-29 NOTE — THERAPY
"Occupational Therapy   Initial Evaluation     Patient Name: Jurgen Valdez  Age:  76 y.o., Sex:  male  Medical Record #: [id number]  Today's Date: 1/28/2022     Precautions: Fall Risk,Weight Bearing As Tolerated Left Lower Extremity,Chest Tube    Assessment  Patient is 76 y.o. male admitted after GLF at home, sustained rib fractures from fall from couch, was down 6-7hrs prior to neighbor finding him. Per chart and pt report, hx multiple falls. Pt presents to OT eval very pleasant and agreeable however decreased safety awareness and insight to deficits noted. Pt will benefit from acute OT intervention and post-acute services d/t high fall risk and decreased independence in ADLs.     Plan    Recommend Occupational Therapy 3 times per week until therapy goals are met for the following treatments:  Adaptive Equipment, Self Care/Activities of Daily Living, Therapeutic Activities and Therapeutic Exercises.    DC Equipment Recommendations: Grab Bar(s) by Toilet,Grab Bar(s) in Tub / Shower  Discharge Recommendations: Recommend post-acute placement for additional occupational therapy services prior to discharge home     Subjective    \"I fall down a lot, but it's ok.\"     Objective     01/28/22 0944   Prior Living Situation   Prior Services None   Housing / Facility 1 Story House   Steps Into Home 1   Bathroom Set up Walk In Shower;Shower Chair   Equipment Owned 4-Wheel Walker   Lives with - Patient's Self Care Capacity Alone and Able to Care For Self   Comments reports supportive neighbor checks on him daily, reports has a shower chair but doesn't use it regularly   Prior Level of ADL Function   Self Feeding Independent   Grooming / Hygiene Independent   Bathing Independent   Dressing Independent   Toileting Independent   Prior Level of IADL Function   Medication Management Independent   Laundry Independent   Kitchen Mobility Independent   Finances Independent   Home Management Independent   Shopping Requires Assist   Comments " neighbor assists   History of Falls   History of Falls Yes   Date of Last Fall   (multiple falls)   Precautions   Precautions Fall Risk;Weight Bearing As Tolerated Left Lower Extremity;Chest Tube   Pain 0 - 10 Group   Therapist Pain Assessment Nurse Notified;Post Activity Pain Same as Prior to Activity;0   Cognition    Level of Consciousness Alert   Comments pleasant, decreased insight to safety   Active ROM Upper Body   Active ROM Upper Body  X   Comments LUE limited at baseline    Balance Assessment   Sitting Balance (Static) Fair   Sitting Balance (Dynamic) Fair -   Standing Balance (Static) Fair -   Standing Balance (Dynamic) Poor +   Weight Shift Sitting Fair   Weight Shift Standing Poor   Comments FWW   Bed Mobility    Supine to Sit Minimal Assist   Scooting Minimal Assist   ADL Assessment   Eating Supervision   Upper Body Dressing Minimal Assist   Lower Body Dressing Maximal Assist   How much help from another person does the patient currently need...   6 Clicks Daily Activity Score 17   Functional Mobility   Sit to Stand Moderate Assist   Bed, Chair, Wheelchair Transfer Moderate Assist   Transfer Method Stand Step   Mobility transfer to chair   ICU Target Mobility Level   ICU Mobility - Targeted Level Level 3B   Edema / Skin Assessment   Edema / Skin  X   Comments multiple bruises noted   Activity Tolerance   Sitting in Chair functional   Sitting Edge of Bed functional   Standing 1min   Patient / Family Goals   Patient / Family Goal #1 home ASAP   Short Term Goals   Short Term Goal # 1 pt will demo functional transfer with SPV   Short Term Goal # 2 pt will complete toileting ADL at SPV level   Short Term Goal # 3 pt will tolerate >5min standing grooming at sink with SPV   Education Group   Education Provided Role of Occupational Therapist;Activities of Daily Living;Home Safety   Role of Occupational Therapist Patient Response Patient;Acceptance;Explanation;Verbal Demonstration   Home Safety Patient Response  Patient;Acceptance;Explanation;Verbal Demonstration   ADL Patient Response Patient;Acceptance;Explanation;Verbal Demonstration   Problem List   Problem List Decreased Homemaking Skills;Decreased Active Daily Living Skills;Safety Awareness Deficits / Cognition;Decreased Functional Mobility;Decreased Activity Tolerance;Impaired Postural Control / Balance   Anticipated Discharge Equipment and Recommendations   DC Equipment Recommendations Grab Bar(s) by Toilet;Grab Bar(s) in Tub / Shower   Discharge Recommendations Recommend post-acute placement for additional occupational therapy services prior to discharge home

## 2022-01-29 NOTE — PROGRESS NOTES
Assumed care of patient at 1300. Bedside report received. Assessment complete.  AA&Ox4. Denies CP/SOB.  Reporting no pain. Declined intervention at this time.   Educated patient regarding pharmacologic and non pharmacologic modalities for pain management.  Skin per flow sheets.  Tolerating regular diet. Denies N/V.  + void. + BM. Last 1/27  Pt ambulates with assistance of 1 or two per chest tube management.  Plan of care discussed, all questions answered. Educated on the importance of calling before getting OOB and pt verbalizes understanding. Educated regarding importance of oral care. Oral care kit at bedside. Call light is within reach, treaded slipper socks on, bed in lowest/ locked position, hourly rounding in place, all needs met at this time.

## 2022-01-29 NOTE — PROGRESS NOTES
Trauma / Surgical Daily Progress Note    Date of Service  1/29/2022    Chief Complaint  76 y.o. male admitted 1/26/2022 post ground level fall. He sustained 4 left sided rib fractures, a left pneumothorax, and left hip injury.     PO procedure day # 2 Left tube thoracostomy     Interval Events  Transferred to [facility].  Adequate pain control.  AM CXR with no pneumothorax but worsening atelectasis and opacities.  Chest tube 24 hr output 250 cc/total 740 cc.   RA. IS [id number].    - Continue chest tube to suction.  - Aggressive pulmonary hygiene.  - Encourage out of bed and mobilization.  - Palliative consult placed.    - Disposition: SNF when medically cleared.  - Counseled.    Review of Systems  Review of Systems   Constitutional: Negative.    HENT: Negative.    Eyes: Negative.    Respiratory: Negative for shortness of breath.    Cardiovascular: Negative.    Gastrointestinal: Negative.    Genitourinary: Negative.    Musculoskeletal: Positive for myalgias (rib fx left). Negative for back pain, joint pain and neck pain.   Skin: Negative.    Neurological: Negative.         Vital Signs  Temp:  [35.9 °C (96.6 °F)-36.6 °C (97.9 °F)] 36.1 °C (97 °F)  Pulse:  [50-88] 69  Resp:  [16-18] 16  BP: ()/(47-66) 106/54  SpO2:  [94 %-97 %] 96 %    Physical Exam  Physical Exam  Vitals and nursing note reviewed.   Constitutional:       General: He is sleeping. He is not in acute distress.     Appearance: He is not ill-appearing, toxic-appearing or diaphoretic.   HENT:      Head: Normocephalic.      Right Ear: External ear normal.      Left Ear: External ear normal.      Nose: Nose normal.      Mouth/Throat:      Mouth: Mucous membranes are moist.      Pharynx: Oropharynx is clear.   Eyes:      General: No scleral icterus.     Extraocular Movements: Extraocular movements intact.      Conjunctiva/sclera: Conjunctivae normal.   Cardiovascular:      Rate and Rhythm: Normal rate and regular rhythm.      Pulses: Normal pulses.    Pulmonary:      Effort: Pulmonary effort is normal. No respiratory distress.      Breath sounds: No stridor. No rhonchi.      Comments: Left chest tube to suction 20 cm  No air leak  IS [id number]    Chest:      Chest wall: Tenderness (Left chesrt wall) present.   Abdominal:      General: Abdomen is flat. Bowel sounds are normal. There is no distension.      Tenderness: There is no abdominal tenderness. There is no guarding.   Genitourinary:     Comments: Maya to gravity.  Musculoskeletal:         General: No swelling or tenderness. Normal range of motion.      Cervical back: Normal range of motion and neck supple.   Skin:     General: Skin is warm and dry.      Capillary Refill: Capillary refill takes 2 to 3 seconds.      Comments: Abrasion left, right elbow   Neurological:      Mental Status: He is oriented to person, place, and time and easily aroused.      GCS: GCS eye subscore is 4. GCS verbal subscore is 4. GCS motor subscore is 6.   Psychiatric:         Mood and Affect: Mood normal.         Behavior: Behavior normal. Behavior is cooperative.         Laboratory  Recent Results (from the past 24 hour(s))   CBC WITH DIFFERENTIAL    Collection Time: 01/29/22  3:45 AM   Result Value Ref Range    WBC 7.1 4.8 - 10.8 K/uL    RBC 2.92 (L) 4.70 - 6.10 M/uL    Hemoglobin 9.5 (L) 14.0 - 18.0 g/dL    Hematocrit 28.9 (L) 42.0 - 52.0 %    MCV 99.0 (H) 81.4 - 97.8 fL    MCH 32.5 27.0 - 33.0 pg    MCHC 32.9 (L) 33.7 - 35.3 g/dL    RDW 51.8 (H) 35.9 - 50.0 fL    Platelet Count 204 164 - 446 K/uL    MPV 9.8 9.0 - 12.9 fL    Neutrophils-Polys 78.60 (H) 44.00 - 72.00 %    Lymphocytes 12.30 (L) 22.00 - 41.00 %    Monocytes 6.60 0.00 - 13.40 %    Eosinophils 1.70 0.00 - 6.90 %    Basophils 0.40 0.00 - 1.80 %    Immature Granulocytes 0.40 0.00 - 0.90 %    Nucleated RBC 0.00 /100 WBC    Neutrophils (Absolute) 5.56 1.82 - 7.42 K/uL    Lymphs (Absolute) 0.87 (L) 1.00 - 4.80 K/uL    Monos (Absolute) 0.47 0.00 - 0.85 K/uL    Eos  (Absolute) 0.12 0.00 - 0.51 K/uL    Baso (Absolute) 0.03 0.00 - 0.12 K/uL    Immature Granulocytes (abs) 0.03 0.00 - 0.11 K/uL    NRBC (Absolute) 0.00 K/uL   Basic Metabolic Panel    Collection Time: 01/29/22  3:45 AM   Result Value Ref Range    Sodium 137 135 - 145 mmol/L    Potassium 4.3 3.6 - 5.5 mmol/L    Chloride 107 96 - 112 mmol/L    Co2 24 20 - 33 mmol/L    Glucose 115 (H) 65 - 99 mg/dL    Bun 17 8 - 22 mg/dL    Creatinine 0.99 0.50 - 1.40 mg/dL    Calcium 8.2 (L) 8.5 - 10.5 mg/dL    Anion Gap 6.0 (L) 7.0 - 16.0   ESTIMATED GFR    Collection Time: 01/29/22  3:45 AM   Result Value Ref Range    GFR If African American >60 >60 mL/min/1.73 m 2    GFR If Non African American >60 >60 mL/min/1.73 m 2       Fluids    Intake/Output Summary (Last 24 hours) at 1/29/2022 1052  Last data filed at 1/29/2022 0958  Gross per 24 hour   Intake 1980 ml   Output 1530 ml   Net 450 ml       Core Measures & Quality Metrics  Core Measures & Quality Metrics  GENIA Score  ETOH Screening    Assessment/Plan  Traumatic pneumothorax- (present on admission)  Assessment & Plan  Small left pneumothorax.   Left chest tube inserted in ICU  1/28 No pneumothorax on AM CXR.  Chest tube to suction with no air leak. Total in pleura vac 390 cc. Continue chest tube to 20 cm suction  1/29 AM CXR with no pneumothorax, worsening opacities in the bilateral lower lungs and worsening atelectasis. Chest tube 24 hr output 250 cc/total output 740.  - Continue chest tube to 20 cm suction. No air leak noted.   Supplemental oxygen to maintain O2 saturations greater than 95%  Aggressive pulmonary hygiene. Serial chest radiographs.     Multiple rib fractures involving four or more ribs- (present on admission)  Assessment & Plan  Fractures of the 3rd, 4th, 5th, and 6th left ribs.  Aggressive multimodal pain management, and pulmonary hygiene.   Serial chest radiographs.     Discharge planning issues- (present on admission)  Assessment & Plan  Date of admission:  1/26/2022.  1/28/2022 Transfer orders from SICU.  1/28/2022 Rehab recommending SNF.  SNF order placed.   Cleared for discharge: No.  Discharge delayed: No.  Discharge date: tbd.     Frequent falls- (present on admission)  Assessment & Plan  Physical and occupational therapy.   1/29 PT/OT recommendations reviewed.     BPH (benign prostatic hyperplasia)- (present on admission)  Assessment & Plan  Chronic condition treated with terazosin.  Resumed maintenance medication on admission.     Hyperlipidemia- (present on admission)  Assessment & Plan  Chronic condition treated with simvastatin.  Resumed maintenance medication on admission.    Hypertension- (present on admission)  Assessment & Plan  Chronic condition treated with lisinopril, metoprolol.  Resumed maintenance medication on admission.   1/28 DC metoprolol secondary to bradycardia.  1/29 Continues to have bradycardia. Systolic blood pressure 100's.     Left hip pain- (present on admission)  Assessment & Plan  [facility] fracture[facility]  No fracture seen on review by Dr Baeza.  Weight bearing status - Weightbearing as tolerated   Seth Baeza MD. Orthopedic Surgeon. [facility].      No contraindication to deep vein thrombosis (DVT) prophylaxis- (present on admission)  Assessment & Plan  Prophylactic dose enoxaparin initiated upon admission.     Trauma- (present on admission)  Assessment & Plan  Fall from a couch one night prior to presentation.   Found down by a neighbor after 6-7 hrs.  History of frequent falls  Trauma Green Transfer Activation.  Lee Grimaldo MD. Trauma Surgery.        Discussed patient condition with RN, Patient and trauma surgery, Dr. Lee Maxwell.

## 2022-01-29 NOTE — PROGRESS NOTES
Assumed care of patient at 0645. Bedside report received. Assessment complete.  AA&Ox4. Denies CP/SOB.  Reporting no pain. Declined intervention at this time.   Educated patient regarding pharmacologic and non pharmacologic modalities for pain management.  Skin per flow sheets.  Tolerating regular diet. Denies N/V.  Maya in place. + BM. Last BM 1/27  Chest tube to the right side.  Pt ambulates with assistance of 2 and utilizes a front wheel walker  Plan of care discussed, all questions answered. Educated on the importance of calling before getting OOB and pt verbalizes understanding. Educated regarding importance of oral care. Oral care kit at bedside. Call light is within reach, treaded slipper socks on, bed in lowest/ locked position, hourly rounding in place, all needs met at this time.

## 2022-01-29 NOTE — PROGRESS NOTES
4 Eyes Skin Assessment Completed by EMELYN Encarnacion and EMELYN Lindsay.    Head WDL  Ears WDL  Nose WDL  Mouth WDL  Neck WDL  Breast/Chest: chest tube in place  Shoulder Blades  abrasions   Spine WDL  (R) Arm/Elbow/Hand: scatter skin abrasions, skin tear to elbow   (L) Arm/Elbow/Hand Scab to elbow  Abdomen WDL  Groin WDL  Scrotum/Coccyx/Buttocks bruising to left buttocks, blister on left hip   (R) Leg Abrasions to lower leg  (L) Leg Abrasion scattered, massive bruise posterior from but to back of knee  (R) Heel/Foot/Toe Scab ankle and foot  (L) Heel/Foot/Toe Scab, black and blue great toe nail          Devices In Places Pulse Ox and Maya, chest tube on left, SCD      Interventions In Place Gray Ear Foams, Pillows, Heels Loaded W/Pillows and Pressure Redistribution Mattress    Possible Skin Injury No    Pictures Uploaded Into Epic N/A  Wound Consult Placed N/A  RN Wound Prevention Protocol Ordered No

## 2022-01-29 NOTE — DIETARY
Nutrition services: Day 2 of admit.  Jurgen Valdez is a 76 y.o. male with admitting DX of trauma  Consult received for MST 2    Met with pt at bedside. Pt reports that he has gradual weight loss since his correction in the 1950's but has not lost any weight recently. He states he has a good appetite and ate most of his breakfast this morning. Pt did not want any additional nutrition supplements at this time. Pt appears nourished.     Assessment:  Height: 182.9 cm (6')  Weight: 89.1 kg (196 lb 6.9 oz)- bed scale  Body mass index is 26.64 kg/m²., BMI classification: overweight  Diet/Intake: Regular Diet- PO intake % x4 meals per ADL's.     Evaluation:   1. Pt admitted for trauma s/p GLF w/ multiple rib fractures, traumatic pneumothorax, hypertension, hyperlipidemia, BPH, frequent fall, left hip pain   2. S/p left tube thoracostomy 1/27  3. Labs: glucose 115  4. Meds: Colace, bowel regimen, Zofran   5. Skin: no pressure related injuries noted  6. GI: last BM 1/28    Malnutrition Risk: Does not meet ASPEN criteria at this time.     Recommendations/Plan:  1. Continue current diet   2. Encourage intake of meals  3. Document intake of all meals as % taken in ADL's to provide interdisciplinary communication across all shifts.   4. Monitor weight.  5. Nutrition rep will continue to see patient for ongoing meal and snack preferences.     No nutrition intervention indicated at this time. RD will continue to follow per department policy.

## 2022-01-29 NOTE — PROGRESS NOTES
Bedside report received.  Assessment complete.  A&O x 4. Patient calls appropriately.  Patient ambulates with x2 with FWW. Bed alarm on.  Patient has 2/10 pain. Pain managed with prescribed medications.  Denies N&V. Tolerating regular diet.  Scattered abrasions and bruising. BLE edema. Weakness to LUE, per pt baseline.  Chest tube to L flank. To suction, dressing CDI.  + void via gregory, + flatus, last BM 1/28.  Patient denies SOB.  SCD's on.  Review plan with of care with patient. Call light and personal belongings with in reach. Hourly rounding in place. All needs met at this time.

## 2022-01-30 ENCOUNTER — APPOINTMENT (OUTPATIENT)
Dept: RADIOLOGY | Facility: MEDICAL CENTER | Age: 77
DRG: 200 | End: 2022-01-30
Attending: SURGERY
Payer: COMMERCIAL

## 2022-01-30 LAB
BASOPHILS # BLD AUTO: 0.7 % (ref 0–1.8)
BASOPHILS # BLD: 0.04 K/UL (ref 0–0.12)
EOSINOPHIL # BLD AUTO: 0.18 K/UL (ref 0–0.51)
EOSINOPHIL NFR BLD: 3.2 % (ref 0–6.9)
ERYTHROCYTE [DISTWIDTH] IN BLOOD BY AUTOMATED COUNT: 51.9 FL (ref 35.9–50)
HCT VFR BLD AUTO: 30 % (ref 42–52)
HGB BLD-MCNC: 9.9 G/DL (ref 14–18)
IMM GRANULOCYTES # BLD AUTO: 0.02 K/UL (ref 0–0.11)
IMM GRANULOCYTES NFR BLD AUTO: 0.4 % (ref 0–0.9)
LYMPHOCYTES # BLD AUTO: 0.71 K/UL (ref 1–4.8)
LYMPHOCYTES NFR BLD: 12.6 % (ref 22–41)
MCH RBC QN AUTO: 32.9 PG (ref 27–33)
MCHC RBC AUTO-ENTMCNC: 33 G/DL (ref 33.7–35.3)
MCV RBC AUTO: 99.7 FL (ref 81.4–97.8)
MONOCYTES # BLD AUTO: 0.54 K/UL (ref 0–0.85)
MONOCYTES NFR BLD AUTO: 9.6 % (ref 0–13.4)
NEUTROPHILS # BLD AUTO: 4.14 K/UL (ref 1.82–7.42)
NEUTROPHILS NFR BLD: 73.5 % (ref 44–72)
NRBC # BLD AUTO: 0 K/UL
NRBC BLD-RTO: 0 /100 WBC
PLATELET # BLD AUTO: 219 K/UL (ref 164–446)
PMV BLD AUTO: 9.8 FL (ref 9–12.9)
RBC # BLD AUTO: 3.01 M/UL (ref 4.7–6.1)
WBC # BLD AUTO: 5.6 K/UL (ref 4.8–10.8)

## 2022-01-30 PROCEDURE — A9270 NON-COVERED ITEM OR SERVICE: HCPCS | Performed by: SURGERY

## 2022-01-30 PROCEDURE — A9270 NON-COVERED ITEM OR SERVICE: HCPCS | Performed by: NURSE PRACTITIONER

## 2022-01-30 PROCEDURE — 700111 HCHG RX REV CODE 636 W/ 250 OVERRIDE (IP): Performed by: SURGERY

## 2022-01-30 PROCEDURE — 71045 X-RAY EXAM CHEST 1 VIEW: CPT

## 2022-01-30 PROCEDURE — 94760 N-INVAS EAR/PLS OXIMETRY 1: CPT

## 2022-01-30 PROCEDURE — 99232 SBSQ HOSP IP/OBS MODERATE 35: CPT | Performed by: SURGERY

## 2022-01-30 PROCEDURE — 700102 HCHG RX REV CODE 250 W/ 637 OVERRIDE(OP): Performed by: SURGERY

## 2022-01-30 PROCEDURE — 700101 HCHG RX REV CODE 250: Performed by: SURGERY

## 2022-01-30 PROCEDURE — 700102 HCHG RX REV CODE 250 W/ 637 OVERRIDE(OP): Performed by: NURSE PRACTITIONER

## 2022-01-30 PROCEDURE — 94669 MECHANICAL CHEST WALL OSCILL: CPT

## 2022-01-30 PROCEDURE — 85025 COMPLETE CBC W/AUTO DIFF WBC: CPT

## 2022-01-30 PROCEDURE — 36415 COLL VENOUS BLD VENIPUNCTURE: CPT

## 2022-01-30 PROCEDURE — 770001 HCHG ROOM/CARE - MED/SURG/GYN PRIV*

## 2022-01-30 RX ADMIN — ACETAMINOPHEN 650 MG: 325 TABLET, FILM COATED ORAL at 17:20

## 2022-01-30 RX ADMIN — SIMVASTATIN 20 MG: 10 TABLET, FILM COATED ORAL at 20:32

## 2022-01-30 RX ADMIN — GABAPENTIN 100 MG: 100 CAPSULE ORAL at 14:10

## 2022-01-30 RX ADMIN — LISINOPRIL 10 MG: 5 TABLET ORAL at 05:08

## 2022-01-30 RX ADMIN — ENOXAPARIN SODIUM 30 MG: 30 INJECTION SUBCUTANEOUS at 05:07

## 2022-01-30 RX ADMIN — GABAPENTIN 100 MG: 100 CAPSULE ORAL at 05:08

## 2022-01-30 RX ADMIN — LIDOCAINE 1 PATCH: 50 PATCH TOPICAL at 05:08

## 2022-01-30 RX ADMIN — ACETAMINOPHEN 650 MG: 325 TABLET, FILM COATED ORAL at 14:10

## 2022-01-30 RX ADMIN — ACETAMINOPHEN 650 MG: 325 TABLET, FILM COATED ORAL at 09:12

## 2022-01-30 RX ADMIN — ENOXAPARIN SODIUM 30 MG: 30 INJECTION SUBCUTANEOUS at 17:20

## 2022-01-30 RX ADMIN — TRAMADOL HYDROCHLORIDE 50 MG: 50 TABLET, COATED ORAL at 07:38

## 2022-01-30 RX ADMIN — GABAPENTIN 100 MG: 100 CAPSULE ORAL at 20:32

## 2022-01-30 RX ADMIN — TERAZOSIN HYDROCHLORIDE 5 MG: 5 CAPSULE ORAL at 20:32

## 2022-01-30 ASSESSMENT — PAIN DESCRIPTION - PAIN TYPE
TYPE: ACUTE PAIN

## 2022-01-30 ASSESSMENT — ENCOUNTER SYMPTOMS
CARDIOVASCULAR NEGATIVE: 1
GASTROINTESTINAL NEGATIVE: 1
VOMITING: 0
ABDOMINAL PAIN: 0
SHORTNESS OF BREATH: 0
COUGH: 0
EYES NEGATIVE: 1
NECK PAIN: 0
CONSTIPATION: 0
NAUSEA: 0
BACK PAIN: 0
NEUROLOGICAL NEGATIVE: 1
CONSTITUTIONAL NEGATIVE: 1
MYALGIAS: 1

## 2022-01-30 NOTE — CARE PLAN
Problem: Knowledge Deficit - Standard  Goal: Patient and family/care givers will demonstrate understanding of plan of care, disease process/condition, diagnostic tests and medications  Outcome: Progressing     Problem: Skin Integrity  Goal: Skin integrity is maintained or improved  Outcome: Progressing     Problem: Fall Risk  Goal: Patient will remain free from falls  Outcome: Progressing     Problem: Pain - Standard  Goal: Alleviation of pain or a reduction in pain to the patient’s comfort goal  Outcome: Progressing   The patient is Stable - Low risk of patient condition declining or worsening    Shift Goals  Clinical Goals: pain control, maintain chest tube  Patient Goals: rest  Family Goals: no family present    Progress made toward(s) clinical / shift goals:  pain control administered PRN. Chest tube discontinued    Patient is not progressing towards the following goals:

## 2022-01-30 NOTE — PROGRESS NOTES
Bedside report received.  Assessment complete.  A&O x 4. Patient calls appropriately.  Patient ambulates with x2 with FWW. Bed alarm on.  Patient has 1/10 pain. Pain managed with prescribed medications.  Denies N&V. Tolerating regular diet.  Scattered abrasions and bruising. BLE edema. Weakness to LUE, per pt baseline.  Chest tube to L flank. To suction, dressing CDI.  + void via gregory, + flatus, last BM 1/29.  Patient denies SOB.  SCD's on.  Review plan with of care with patient. Call light and personal belongings with in reach. Hourly rounding in place. All needs met at this time.

## 2022-01-30 NOTE — DISCHARGE PLANNING
Received Choice form at 0934  Agency/Facility Name: [facility]   Referral sent per Choice form @ 2735

## 2022-01-30 NOTE — CARE PLAN
The patient is Stable - Low risk of patient condition declining or worsening    Shift Goals  Clinical Goals: chest tube management, safety, movement in bed  Patient Goals: rest  Family Goals: no family present    Progress made toward(s) clinical / shift goals:  Pt is resting comfortably in bed. Pain is being controlled with PRN and scheduled medications. Pt is able to utilize 0-10 pain scale. Pt denies N/V at this time. Chest tube to suction, dressing CDI. Proper fall precautions in place. Call light within reach and encouraged to use. Hourly rounding in practice. Bed alarm in use.      Patient is not progressing towards the following goals:

## 2022-01-30 NOTE — DISCHARGE PLANNING
Care Transition Team Assessment    LSW completed chart review and met with pt at bedside to complete assessment. Pt A&Ox4 and able to verify the information on the face sheet. Pt reported he lives alone in a single story house that has one small step to enter. Prior to this hospitalization pt was independent with all ADLs and IADLs. Pt uses a [employer] and denies having any other DME. Pt's only support is his friend, Michael, who checks on the patient daily and provides transportation for the pt as needed. Pt reported Michael has an autistic son at home who he cares for and thus would only be able to be with the pt for a couple hours/day if he discharges home.    Pt is retired and receives SSI and FCI of about $2,000/month. Pt denies any SA or MH concerns. Pt has ACP documents already scanned into his chart. Pt stated Michael will be able to provide transportation home upon DC.    Per chart review [facility] not [facility], however has Medicare part A and B. Per the pt he only has Medicare part B which does not cover SNF. LSW sent email to [facility] to verify pt's insurance. [facility] verified pt has both Medicare part A and B. LSW requested Medicare information be added to pt's face sheet.    LSW spoke with pt about going home vs going to SNF. Discussed risks of going home and benefits of SNF. Pt voiced understanding and expressed that if the recommendation is for SNF he is agreeable with going to SNF for a short time prior to returning home. LSW faxed blanket SNF choice to [facility].    Information Source  Orientation Level: Oriented X4  Information Given By: Patient  Informant's Name: Jurgen Valdez  Who is responsible for making decisions for patient? : Patient    Readmission Evaluation  Is this a readmission?: No    Elopement Risk  Legal Hold: No  Ambulatory or Self Mobile in Wheelchair: Yes  Disoriented: No  Psychiatric Symptoms: None  History of Wandering: No  Elopement this Admit: No  Vocalizing Wanting to Leave:  No  Displays Behaviors, Body Language Wanting to Leave: No-Not at Risk for Elopement  Elopement Risk: Not at Risk for Elopement    Interdisciplinary Discharge Planning  Lives with - Patient's Self Care Capacity: Alone and Able to Care For Self  Patient or legal guardian wants to designate a caregiver: No  Housing / Facility: [address]  Prior Services: None  Durable Medical Equipment: Walker    Discharge Preparedness  What is your plan after discharge?: Skilled nursing facility  What are your discharge supports?: Other (comment) (friend)  Prior Functional Level: Ambulatory,Independent with Activities of Daily Living,Independent with Medication Management,Uses Walker  Difficulity with ADLs: None  Difficulity with IADLs: Driving    Functional Assesment  Prior Functional Level: Ambulatory,Independent with Activities of Daily Living,Independent with Medication Management,Uses Walker    Finances  Financial Barriers to Discharge: No  Prescription Coverage: Yes    Vision / Hearing Impairment  Vision Impairment : Yes  Right Eye Vision: Impaired,Wears Glasses  Left Eye Vision: Impaired,Wears Glasses  Hearing Impairment : No    Advance Directive  Advance Directive?: DPOA for Health Care  Durable Power of  Name and Contact : Michael Keenan    Domestic Abuse  Have you ever been the victim of abuse or violence?: No  Physical Abuse or Sexual Abuse: No  Verbal Abuse or Emotional Abuse: No  Possible Abuse/Neglect Reported to:: Not Applicable    Psychological Assessment  History of Substance Abuse: None  History of Psychiatric Problems: No  Non-compliant with Treatment: No  Newly Diagnosed Illness: No    Discharge Risks or Barriers  Discharge risks or barriers?: Lives alone, no community support,Uninsured / underinsured  Patient risk factors: Lack of outside supports,Lives alone and no community support,Uninsured or underinsured    Anticipated Discharge Information  Discharge Disposition: D/T to SNF with Medicare cert in  anticipation of skilled care (03)

## 2022-01-30 NOTE — PROGRESS NOTES
Trauma / Surgical Daily Progress Note    Date of Service  1/30/2022    Chief Complaint  76 y.o. male admitted 1/26/2022 post ground level fall. He sustained 4 left sided rib fractures, a left pneumothorax, and left hip injury.     PO procedure day # 3 Left tube thoracostomy.    Interval Events  Adequate pain control.  AM CXR with bilateral lower infiltrates.  No leukocytosis, afebrile, on room air and nontoxic appearance.   Chest tube 24 hr output 50 cc/total 790 cc.  IS [id number].    - Chest tube to water seal.  - Aggressive pulmonary hygiene. PEP ordered.  - Encourage out of bed for meals and mobilization.  - Trial gregory removal.  - Disposition: SNF, awaiting acceptance. Medically cleared.   - Counseled.     Review of Systems  Review of Systems   Constitutional: Negative.    HENT: Negative.    Eyes: Negative.    Respiratory: Negative for cough and shortness of breath.    Cardiovascular: Negative.    Gastrointestinal: Negative.  Negative for abdominal pain, constipation (Last BM 1/29), nausea and vomiting.   Genitourinary: Negative.  Negative for dysuria.   Musculoskeletal: Positive for myalgias (rib fx left). Negative for back pain, joint pain and neck pain.   Skin: Negative.    Neurological: Negative.         Vital Signs  Temp:  [36.1 °C (96.9 °F)-36.4 °C (97.6 °F)] 36.4 °C (97.6 °F)  Pulse:  [60-74] 68  Resp:  [17-18] 18  BP: (125-143)/(54-65) 143/62  SpO2:  [93 %-96 %] 93 %    Physical Exam  Physical Exam  Vitals and nursing note reviewed.   Constitutional:       General: He is sleeping. He is not in acute distress.     Appearance: He is not ill-appearing, toxic-appearing or diaphoretic.   HENT:      Head: Normocephalic.      Right Ear: External ear normal.      Left Ear: External ear normal.      Nose: Nose normal.      Mouth/Throat:      Mouth: Mucous membranes are moist.      Pharynx: Oropharynx is clear.   Eyes:      General: No scleral icterus.     Extraocular Movements: Extraocular movements intact.       Conjunctiva/sclera: Conjunctivae normal.   Cardiovascular:      Rate and Rhythm: Normal rate and regular rhythm.      Pulses: Normal pulses.   Pulmonary:      Effort: Pulmonary effort is normal. No respiratory distress.      Breath sounds: No stridor. No rhonchi.      Comments: Left chest tube to water seal.   No air leak  IS [id number]    Chest:      Chest wall: Tenderness (Left chesrt wall) present.   Abdominal:      General: Abdomen is flat. Bowel sounds are normal. There is no distension.      Tenderness: There is no abdominal tenderness. There is no guarding.   Genitourinary:     Comments: Maya to gravity.  Musculoskeletal:         General: No swelling or tenderness. Normal range of motion.      Cervical back: Normal range of motion and neck supple.   Skin:     General: Skin is warm and dry.      Capillary Refill: Capillary refill takes 2 to 3 seconds.      Comments: Abrasion left, right elbow   Neurological:      Mental Status: He is oriented to person, place, and time and easily aroused.      GCS: GCS eye subscore is 4. GCS verbal subscore is 4. GCS motor subscore is 6.   Psychiatric:         Mood and Affect: Mood normal.         Behavior: Behavior normal. Behavior is cooperative.         Laboratory  Recent Results (from the past 24 hour(s))   CBC WITH DIFFERENTIAL    Collection Time: 01/30/22  3:06 AM   Result Value Ref Range    WBC 5.6 4.8 - 10.8 K/uL    RBC 3.01 (L) 4.70 - 6.10 M/uL    Hemoglobin 9.9 (L) 14.0 - 18.0 g/dL    Hematocrit 30.0 (L) 42.0 - 52.0 %    MCV 99.7 (H) 81.4 - 97.8 fL    MCH 32.9 27.0 - 33.0 pg    MCHC 33.0 (L) 33.7 - 35.3 g/dL    RDW 51.9 (H) 35.9 - 50.0 fL    Platelet Count 219 164 - 446 K/uL    MPV 9.8 9.0 - 12.9 fL    Neutrophils-Polys 73.50 (H) 44.00 - 72.00 %    Lymphocytes 12.60 (L) 22.00 - 41.00 %    Monocytes 9.60 0.00 - 13.40 %    Eosinophils 3.20 0.00 - 6.90 %    Basophils 0.70 0.00 - 1.80 %    Immature Granulocytes 0.40 0.00 - 0.90 %    Nucleated RBC 0.00 /100 WBC     Neutrophils (Absolute) 4.14 1.82 - 7.42 K/uL    Lymphs (Absolute) 0.71 (L) 1.00 - 4.80 K/uL    Monos (Absolute) 0.54 0.00 - 0.85 K/uL    Eos (Absolute) 0.18 0.00 - 0.51 K/uL    Baso (Absolute) 0.04 0.00 - 0.12 K/uL    Immature Granulocytes (abs) 0.02 0.00 - 0.11 K/uL    NRBC (Absolute) 0.00 K/uL       Fluids    Intake/Output Summary (Last 24 hours) at 1/30/2022 0955  Last data filed at 1/30/2022 0913  Gross per 24 hour   Intake 600 ml   Output 2370 ml   Net -1770 ml       Core Measures & Quality Metrics  Radiology images reviewed, Labs reviewed and Medications reviewed  Maya catheter: Urinary Tract Retention or Urinary Tract Obstruction (Trial removal 1/30)      DVT Prophylaxis: Enoxaparin (Lovenox)  DVT prophylaxis - mechanical: SCDs  Ulcer prophylaxis: Not indicated    Assessed for rehab: Patient was assess for and/or received rehabilitation services during this hospitalization    RAP Score Total: 6    Assesment ETOH: CAGE not completed. Admission diagnostic alcohol < 10.1  Denies alcohol abuse.         Assessment/Plan  Traumatic pneumothorax- (present on admission)  Assessment & Plan  Small left pneumothorax.   Left chest tube inserted in ICU  1/28 No pneumothorax on AM CXR.  Chest tube to suction with no air leak. Total in pleura vac 390 cc. Continue chest tube to 20 cm suction  1/29 AM CXR with no pneumothorax, worsening opacities in the bilateral lower lungs and worsening atelectasis. Chest tube 24 hr output 250 cc/total output 740.  - Continue chest tube to 20 cm suction. No air leak noted.   1/30 AM CXR with bilateral lower lobe infiltrates, right greater than left. Chest tube 24 hr output 50 cc/total 790.  - Chest tube to water seal.   Supplemental oxygen to maintain O2 saturations greater than 95%  Aggressive pulmonary hygiene. Serial chest radiographs.     Multiple rib fractures involving four or more ribs- (present on admission)  Assessment & Plan  Fractures of the 3rd, 4th, 5th, and 6th left  ribs.  1/30 AM CXR with bilateral lower lobe infiltrates, right greater than left.  - PEP therapy added.   Aggressive multimodal pain management, and pulmonary hygiene.   Serial chest radiographs.     Discharge planning issues- (present on admission)  Assessment & Plan  Date of admission: 1/26/2022.  1/28/2022 Transfer orders from SICU.  1/28/2022 Rehab recommending SNF.  SNF order placed.   Cleared for discharge: No.  Discharge delayed: No.  Discharge date: tbd.     Frequent falls- (present on admission)  Assessment & Plan  Physical and occupational therapy.   1/29 PT/OT recommendations reviewed.     BPH (benign prostatic hyperplasia)- (present on admission)  Assessment & Plan  Chronic condition treated with terazosin.  Resumed maintenance medication on admission.     Hyperlipidemia- (present on admission)  Assessment & Plan  Chronic condition treated with simvastatin.  Resumed maintenance medication on admission.    Hypertension- (present on admission)  Assessment & Plan  Chronic condition treated with lisinopril, metoprolol.  Resumed maintenance medication on admission.   1/28 DC metoprolol secondary to bradycardia.  1/29 Continues to have bradycardia. Systolic blood pressure 100's.     Left hip pain- (present on admission)  Assessment & Plan  Report of left hip fracture.  [facility]  No fracture seen on review by Dr Baeza.  Weight bearing status - Weightbearing as tolerated   Seth Baeza MD. Orthopedic Surgeon. [facility].      No contraindication to deep vein thrombosis (DVT) prophylaxis- (present on admission)  Assessment & Plan  Prophylactic dose enoxaparin initiated upon admission.     Trauma- (present on admission)  Assessment & Plan  Fall from a couch one night prior to presentation.   Found down by a neighbor after 6-7 hrs.  History of frequent falls  Trauma Green Transfer Activation.  Lee Grimaldo MD. Trauma Surgery.        Discussed patient condition with RN, Patient and  trauma surgery, Dr. Lee Maxwell.

## 2022-01-30 NOTE — CARE PLAN
Problem: Hyperinflation  Goal: Prevent or improve atelectasis  Description: Target End Date:  3 to 4 days    1. Instruct incentive spirometry usage  2.  Perform hyperinflation therapy as indicated  Outcome: Progressing  Flowsheets (Taken 1/27/2022 6333 by Neema Contreras, RRT)  Hyperinflation Protocol Indications: Chest Trauma (Blunt, Penetrative, Crushing, or Surgical)

## 2022-01-30 NOTE — PROGRESS NOTES
Bedside report received.  Assessment complete.  A&O x 4. Patient calls appropriately.  Patient ambulates with two assist and FWW. Bed alarm on.   Patient has 8/10 pain. Pain managed with prescribed medications.  Denies N&V. Tolerating regular diet.  Surgical dressing CDI. Chest tube patent to water seal.  + void in gregory, + flatus, + BM.  Patient denies SOB.  SCD's on.  Patient is pleasant and cooperative with the care plan.  Review plan with of care with patient. Call light and personal belongings within reach. Hourly rounding in place. All needs met at this time.  /62   Pulse 68   Temp 36.4 °C (97.6 °F) (Temporal)   Resp 18   Ht 1.829 m (6')   Wt 89.1 kg (196 lb 6.9 oz)   SpO2 93%   BMI 26.64 kg/m²

## 2022-01-30 NOTE — CARE PLAN
Problem: Hyperinflation  Goal: Prevent or improve atelectasis  Description: Target End Date:  3 to 4 days    1. Instruct incentive spirometry usage  2.  Perform hyperinflation therapy as indicated  Outcome: Progressing    IS = 2500

## 2022-01-31 ENCOUNTER — APPOINTMENT (OUTPATIENT)
Dept: RADIOLOGY | Facility: MEDICAL CENTER | Age: 77
DRG: 200 | End: 2022-01-31
Payer: COMMERCIAL

## 2022-01-31 LAB
BASOPHILS # BLD AUTO: 0.5 % (ref 0–1.8)
BASOPHILS # BLD: 0.03 K/UL (ref 0–0.12)
EOSINOPHIL # BLD AUTO: 0.18 K/UL (ref 0–0.51)
EOSINOPHIL NFR BLD: 3.2 % (ref 0–6.9)
ERYTHROCYTE [DISTWIDTH] IN BLOOD BY AUTOMATED COUNT: 51.1 FL (ref 35.9–50)
HCT VFR BLD AUTO: 33.5 % (ref 42–52)
HGB BLD-MCNC: 11.2 G/DL (ref 14–18)
IMM GRANULOCYTES # BLD AUTO: 0.05 K/UL (ref 0–0.11)
IMM GRANULOCYTES NFR BLD AUTO: 0.9 % (ref 0–0.9)
LYMPHOCYTES # BLD AUTO: 0.61 K/UL (ref 1–4.8)
LYMPHOCYTES NFR BLD: 10.9 % (ref 22–41)
MCH RBC QN AUTO: 33.2 PG (ref 27–33)
MCHC RBC AUTO-ENTMCNC: 33.4 G/DL (ref 33.7–35.3)
MCV RBC AUTO: 99.4 FL (ref 81.4–97.8)
MONOCYTES # BLD AUTO: 0.52 K/UL (ref 0–0.85)
MONOCYTES NFR BLD AUTO: 9.3 % (ref 0–13.4)
NEUTROPHILS # BLD AUTO: 4.21 K/UL (ref 1.82–7.42)
NEUTROPHILS NFR BLD: 75.2 % (ref 44–72)
NRBC # BLD AUTO: 0 K/UL
NRBC BLD-RTO: 0 /100 WBC
PLATELET # BLD AUTO: 216 K/UL (ref 164–446)
PMV BLD AUTO: 9.8 FL (ref 9–12.9)
RBC # BLD AUTO: 3.37 M/UL (ref 4.7–6.1)
WBC # BLD AUTO: 5.6 K/UL (ref 4.8–10.8)

## 2022-01-31 PROCEDURE — 99232 SBSQ HOSP IP/OBS MODERATE 35: CPT | Performed by: SURGERY

## 2022-01-31 PROCEDURE — 94669 MECHANICAL CHEST WALL OSCILL: CPT

## 2022-01-31 PROCEDURE — 36415 COLL VENOUS BLD VENIPUNCTURE: CPT

## 2022-01-31 PROCEDURE — 700102 HCHG RX REV CODE 250 W/ 637 OVERRIDE(OP): Performed by: SURGERY

## 2022-01-31 PROCEDURE — A9270 NON-COVERED ITEM OR SERVICE: HCPCS | Performed by: NURSE PRACTITIONER

## 2022-01-31 PROCEDURE — 700111 HCHG RX REV CODE 636 W/ 250 OVERRIDE (IP): Performed by: SURGERY

## 2022-01-31 PROCEDURE — 700101 HCHG RX REV CODE 250: Performed by: SURGERY

## 2022-01-31 PROCEDURE — 770001 HCHG ROOM/CARE - MED/SURG/GYN PRIV*

## 2022-01-31 PROCEDURE — A9270 NON-COVERED ITEM OR SERVICE: HCPCS | Performed by: SURGERY

## 2022-01-31 PROCEDURE — 71045 X-RAY EXAM CHEST 1 VIEW: CPT

## 2022-01-31 PROCEDURE — 700102 HCHG RX REV CODE 250 W/ 637 OVERRIDE(OP): Performed by: NURSE PRACTITIONER

## 2022-01-31 PROCEDURE — 85025 COMPLETE CBC W/AUTO DIFF WBC: CPT

## 2022-01-31 PROCEDURE — 97530 THERAPEUTIC ACTIVITIES: CPT

## 2022-01-31 RX ADMIN — ACETAMINOPHEN 650 MG: 325 TABLET, FILM COATED ORAL at 22:25

## 2022-01-31 RX ADMIN — GABAPENTIN 100 MG: 100 CAPSULE ORAL at 04:40

## 2022-01-31 RX ADMIN — GABAPENTIN 100 MG: 100 CAPSULE ORAL at 13:46

## 2022-01-31 RX ADMIN — LISINOPRIL 10 MG: 5 TABLET ORAL at 04:40

## 2022-01-31 RX ADMIN — LIDOCAINE 1 PATCH: 50 PATCH TOPICAL at 04:40

## 2022-01-31 RX ADMIN — ENOXAPARIN SODIUM 30 MG: 30 INJECTION SUBCUTANEOUS at 04:40

## 2022-01-31 RX ADMIN — ACETAMINOPHEN 650 MG: 325 TABLET, FILM COATED ORAL at 13:46

## 2022-01-31 RX ADMIN — GABAPENTIN 100 MG: 100 CAPSULE ORAL at 22:25

## 2022-01-31 RX ADMIN — ACETAMINOPHEN 650 MG: 325 TABLET, FILM COATED ORAL at 08:20

## 2022-01-31 RX ADMIN — ENOXAPARIN SODIUM 30 MG: 30 INJECTION SUBCUTANEOUS at 17:40

## 2022-01-31 RX ADMIN — SENNOSIDES AND DOCUSATE SODIUM 1 TABLET: 50; 8.6 TABLET ORAL at 22:26

## 2022-01-31 RX ADMIN — ACETAMINOPHEN 650 MG: 325 TABLET, FILM COATED ORAL at 17:40

## 2022-01-31 RX ADMIN — SIMVASTATIN 20 MG: 10 TABLET, FILM COATED ORAL at 22:26

## 2022-01-31 RX ADMIN — TERAZOSIN HYDROCHLORIDE 5 MG: 5 CAPSULE ORAL at 22:25

## 2022-01-31 ASSESSMENT — GAIT ASSESSMENTS
GAIT LEVEL OF ASSIST: MODERATE ASSIST
DISTANCE (FEET): 1
ASSISTIVE DEVICE: FRONT WHEEL WALKER

## 2022-01-31 ASSESSMENT — ENCOUNTER SYMPTOMS
NEUROLOGICAL NEGATIVE: 1
ABDOMINAL PAIN: 0
CONSTIPATION: 0
VOMITING: 0
CONSTITUTIONAL NEGATIVE: 1
EYES NEGATIVE: 1
COUGH: 0
MYALGIAS: 1
CARDIOVASCULAR NEGATIVE: 1
NECK PAIN: 0
NAUSEA: 0
BACK PAIN: 0
GASTROINTESTINAL NEGATIVE: 1
SHORTNESS OF BREATH: 0

## 2022-01-31 ASSESSMENT — PATIENT HEALTH QUESTIONNAIRE - PHQ9
1. LITTLE INTEREST OR PLEASURE IN DOING THINGS: NOT AT ALL
SUM OF ALL RESPONSES TO PHQ9 QUESTIONS 1 AND 2: 0
2. FEELING DOWN, DEPRESSED, IRRITABLE, OR HOPELESS: NOT AT ALL

## 2022-01-31 ASSESSMENT — PAIN DESCRIPTION - PAIN TYPE
TYPE: ACUTE PAIN
TYPE: ACUTE PAIN

## 2022-01-31 ASSESSMENT — COGNITIVE AND FUNCTIONAL STATUS - GENERAL
WALKING IN HOSPITAL ROOM: A LOT
CLIMB 3 TO 5 STEPS WITH RAILING: TOTAL
MOVING TO AND FROM BED TO CHAIR: A LOT
TURNING FROM BACK TO SIDE WHILE IN FLAT BAD: A LOT
SUGGESTED CMS G CODE MODIFIER MOBILITY: CL
MOVING FROM LYING ON BACK TO SITTING ON SIDE OF FLAT BED: A LOT
STANDING UP FROM CHAIR USING ARMS: A LOT
MOBILITY SCORE: 11

## 2022-01-31 NOTE — FLOWSHEET NOTE
Respiratory Therapy Update            $ PEP/CPT Performed: PEP / Flutter (01/30/22 1540)          Cough: Non Productive (01/29/22 0820)  Sputum Amount: Unable to Evaluate (01/27/22 1511)  Sputum Color: Unable to Evaluate (01/27/22 1511)  Sputum Consistency: Unable to Evaluate (01/27/22 1511)                  O2 (LPM): 0 (01/29/22 1541)       Breath Sounds  RUL Breath Sounds: Clear (01/30/22 1540)  RML Breath Sounds: Diminished;Fine Crackles (01/30/22 1540)  RLL Breath Sounds: Diminished (01/30/22 1540)  AMY Breath Sounds: Clear (01/30/22 1540)  LLL Breath Sounds: Diminished;Fine Crackles (01/30/22 1540)        Events/Summary/Plan: PEP/IS done with good effort.  No distress.  Tolerated well. (01/30/22 1540)

## 2022-01-31 NOTE — PROGRESS NOTES
Trauma / Surgical Daily Progress Note    Date of Service  1/31/2022    Chief Complaint  76 y.o. male admitted 1/26/2022 post ground level fall. He sustained 4 left sided rib fractures, a left pneumothorax, and left hip injury.     PO procedure day # 4 Left tube thoracostomy.    Interval Events  No critical events overnight.   Adequate pain control.  AM CXR with stable patchy bilateral lower lobe infiltrates.  No leukocytosis. Afebrile. Nontoxic appearance.  Chest tube 24 hr output 120 cc/total output 910 cc.  Urinating in urinal.  IS [id number].     - Discontinue chest tube.  - CXR and CBC ordered for AM.  - Advance bowel protocol.  - Aggressive pulmonary hygiene.  - Encourage out of bed and mobilization.  - Disposition: Accepted to a few SNFs. Awaiting for bed availability and medical clearance.   - Counseled.      Review of Systems  Review of Systems   Constitutional: Negative.    HENT: Negative.    Eyes: Negative.    Respiratory: Negative for cough and shortness of breath.    Cardiovascular: Negative.    Gastrointestinal: Negative.  Negative for abdominal pain, constipation (Last BM 1/29, refusing bowel medications.), nausea and vomiting.   Genitourinary: Negative.  Negative for dysuria.   Musculoskeletal: Positive for myalgias (rib fx left). Negative for back pain, joint pain and neck pain.   Skin: Negative.    Neurological: Negative.         Vital Signs  Temp:  [36.2 °C (97.1 °F)-36.7 °C (98 °F)] 36.7 °C (98 °F)  Pulse:  [61-78] 75  Resp:  [16-18] 16  BP: (114-149)/(50-82) 149/64  SpO2:  [90 %-95 %] 95 %    Physical Exam  Physical Exam  Vitals and nursing note reviewed.   Constitutional:       General: He is awake. He is not in acute distress.     Appearance: He is not ill-appearing, toxic-appearing or diaphoretic.   HENT:      Head: Normocephalic.      Right Ear: External ear normal.      Left Ear: External ear normal.      Nose: Nose normal.      Mouth/Throat:      Mouth: Mucous membranes are moist.       Pharynx: Oropharynx is clear.   Eyes:      General: No scleral icterus.     Extraocular Movements: Extraocular movements intact.      Conjunctiva/sclera: Conjunctivae normal.   Cardiovascular:      Rate and Rhythm: Normal rate and regular rhythm.      Pulses: Normal pulses.   Pulmonary:      Effort: Pulmonary effort is normal. No respiratory distress.      Breath sounds: No stridor. No rhonchi.      Comments: Left chest tube to water seal.  No air leak  IS [id number]. RA.    Chest:      Chest wall: Tenderness (Left chesrt wall) present.   Abdominal:      General: Abdomen is flat. Bowel sounds are normal. There is no distension.      Tenderness: There is no abdominal tenderness. There is no guarding.      Comments: Refusing bowel medications. Education provided.    Genitourinary:     Comments: Using urinal.  Musculoskeletal:         General: No swelling or tenderness. Normal range of motion.      Cervical back: Normal range of motion and neck supple.   Skin:     General: Skin is warm and dry.      Capillary Refill: Capillary refill takes 2 to 3 seconds.      Comments: Abrasion left, right elbow   Neurological:      Mental Status: He is alert, oriented to person, place, and time and easily aroused.      GCS: GCS eye subscore is 4. GCS verbal subscore is 4. GCS motor subscore is 6.   Psychiatric:         Mood and Affect: Mood normal.         Behavior: Behavior normal. Behavior is cooperative.         Laboratory  Recent Results (from the past 24 hour(s))   CBC WITH DIFFERENTIAL    Collection Time: 01/31/22  5:21 AM   Result Value Ref Range    WBC 5.6 4.8 - 10.8 K/uL    RBC 3.37 (L) 4.70 - 6.10 M/uL    Hemoglobin 11.2 (L) 14.0 - 18.0 g/dL    Hematocrit 33.5 (L) 42.0 - 52.0 %    MCV 99.4 (H) 81.4 - 97.8 fL    MCH 33.2 (H) 27.0 - 33.0 pg    MCHC 33.4 (L) 33.7 - 35.3 g/dL    RDW 51.1 (H) 35.9 - 50.0 fL    Platelet Count 216 164 - 446 K/uL    MPV 9.8 9.0 - 12.9 fL    Neutrophils-Polys 75.20 (H) 44.00 - 72.00 %    Lymphocytes  10.90 (L) 22.00 - 41.00 %    Monocytes 9.30 0.00 - 13.40 %    Eosinophils 3.20 0.00 - 6.90 %    Basophils 0.50 0.00 - 1.80 %    Immature Granulocytes 0.90 0.00 - 0.90 %    Nucleated RBC 0.00 /100 WBC    Neutrophils (Absolute) 4.21 1.82 - 7.42 K/uL    Lymphs (Absolute) 0.61 (L) 1.00 - 4.80 K/uL    Monos (Absolute) 0.52 0.00 - 0.85 K/uL    Eos (Absolute) 0.18 0.00 - 0.51 K/uL    Baso (Absolute) 0.03 0.00 - 0.12 K/uL    Immature Granulocytes (abs) 0.05 0.00 - 0.11 K/uL    NRBC (Absolute) 0.00 K/uL       Fluids    Intake/Output Summary (Last 24 hours) at 1/31/2022 1040  Last data filed at 1/31/2022 0815  Gross per 24 hour   Intake 240 ml   Output 3180 ml   Net -2940 ml       Core Measures & Quality Metrics  Radiology images reviewed, Labs reviewed and Medications reviewed  Maya catheter: No Maya      DVT Prophylaxis: Enoxaparin (Lovenox)  DVT prophylaxis - mechanical: SCDs  Ulcer prophylaxis: Not indicated    Assessed for rehab: Patient was assess for and/or received rehabilitation services during this hospitalization    RAP Score Total: 6    Assesment ETOH: Admission diagnostic alcohol < 10.1  Denies alcohol abuse.         Assessment/Plan  Traumatic pneumothorax- (present on admission)  Assessment & Plan  Small left pneumothorax.   Left chest tube inserted in ICU  1/28 No pneumothorax on AM CXR.  Chest tube to suction with no air leak. Total in pleura vac 390 cc. Continue chest tube to 20 cm suction  1/29 AM CXR with no pneumothorax, worsening opacities in the bilateral lower lungs and worsening atelectasis. Chest tube 24 hr output 250 cc/total output 740.  - Continue chest tube to 20 cm suction. No air leak noted.   1/30 AM CXR with bilateral lower lobe infiltrates, right greater than left. Chest tube 24 hr output 50 cc/total 790.  - Chest tube to water seal.   1/31 AM CXR with stable bilateral infiltrates. Chest tube 24 hr output 120/total 910. Discontinue chest tube.  - CXR and CBC in the AM.   Supplemental oxygen  to maintain O2 saturations greater than 95%  Aggressive pulmonary hygiene. Serial chest radiographs.     Multiple rib fractures involving four or more ribs- (present on admission)  Assessment & Plan  Fractures of the 3rd, 4th, 5th, and 6th left ribs.  1/30 AM CXR with bilateral lower lobe infiltrates, right greater than left.  - PEP therapy added.   1/31 AM CXR with stable bilateral infiltrates.   Aggressive multimodal pain management, and pulmonary hygiene.   Serial chest radiographs.     Discharge planning issues- (present on admission)  Assessment & Plan  Date of admission: 1/26/2022.  1/28/2022 Transfer orders from SICU.  1/28/2022 Rehab recommending SNF. Patient has been accepted to a few SNFs. Waiting on bed availability.    Cleared for discharge: No. Medical clearance.  Discharge delayed: No.  Discharge date: tbd.     Frequent falls- (present on admission)  Assessment & Plan  Physical and occupational therapy.   1/29 PT/OT recommendations reviewed.     BPH (benign prostatic hyperplasia)- (present on admission)  Assessment & Plan  Chronic condition treated with terazosin.  Resumed maintenance medication on admission.     Hyperlipidemia- (present on admission)  Assessment & Plan  Chronic condition treated with simvastatin.  Resumed maintenance medication on admission.    Hypertension- (present on admission)  Assessment & Plan  Chronic condition treated with lisinopril, metoprolol.  Resumed maintenance medication on admission.   1/28 DC metoprolol secondary to bradycardia.  1/29 Continues to have bradycardia. Systolic blood pressure 100's.     Left hip pain- (present on admission)  Assessment & Plan  Report of left hip fracture.  [facility]  No fracture seen on review by Dr Baeza.  Weight bearing status - Weightbearing as tolerated   Seth Baeza MD. Orthopedic Surgeon. [facility].      No contraindication to deep vein thrombosis (DVT) prophylaxis- (present on admission)  Assessment &  Plan  Prophylactic dose enoxaparin initiated upon admission.     Trauma- (present on admission)  Assessment & Plan  Fall from a couch one night prior to presentation.   Found down by a neighbor after 6-7 hrs.  History of frequent falls  Trauma Green Transfer Activation.  Lee Grimaldo MD. Trauma Surgery.      Discussed patient condition with RN, Patient and trauma surgery, Dr. Lee Maxwell.

## 2022-01-31 NOTE — PROGRESS NOTES
CXR reviewed with Dr. Maxwell.    No air leak present. Chest tube removed. Dressing placed.     AM CXR ordered.

## 2022-01-31 NOTE — THERAPY
Physical Therapy   Daily Treatment     Patient Name: Jurgen Valdez  Age:  77 y.o., Sex:  male  Medical Record #: [id number]  Today's Date: 1/31/2022     Precautions  Precautions: Fall Risk;Weight Bearing As Tolerated Left Lower Extremity    Assessment    Pt struggling with sit to stand and ambulation, needs bed height raised and incontinent of urine in standing, then needed emergent BM on commode, total assist to clean up. PT to continue.     Plan    Continue current treatment plan.    DC Equipment Recommendations: Unable to determine at this time  Discharge Recommendations: Recommend post-acute placement for additional physical therapy services prior to discharge home      Objective       01/31/22 1417   Total Time Spent   Total Time Spent (Mins) 45   Charge Group   Charges  Yes   PT Therapeutic Activities 3   Precautions   Precautions Fall Risk;Weight Bearing As Tolerated Left Lower Extremity   Vitals   O2 Delivery Device None - Room Air   Pain 0 - 10 Group   Therapist Pain Assessment During Activity;Nurse Notified  (L ribs painful, not rated.)   Cognition    Cognition / Consciousness X   Level of Consciousness Alert   Comments decreased safety awareness.   Active ROM Lower Body    Active ROM Lower Body  X   Comments B knees stiff, s/p prior L TKA   Strength Lower Body   Lower Body Strength  X   Comments L LE 3/5   Balance   Sitting Balance (Static) Fair -   Sitting Balance (Dynamic) Fair   Standing Balance (Static) Trace +   Standing Balance (Dynamic) Trace +   Weight Shift Sitting Poor   Weight Shift Standing Poor   Skilled Intervention Verbal Cuing   Gait Analysis   Gait Level Of Assist Moderate Assist   Assistive Device Front Wheel Walker   Distance (Feet) 1   # of Times Distance was Traveled 1   Deviation   (weak, unsafe, needed to have BM, distracted)   Weight Bearing Status WBAT L LE   Skilled Intervention Verbal Cuing   Bed Mobility    Supine to Sit Moderate Assist   Sit to Supine Minimal Assist   Scooting  Moderate Assist   Rolling Minimum Assist to Lt.   Skilled Intervention Verbal Cuing   Functional Mobility   Sit to Stand Moderate Assist   Bed, Chair, Wheelchair Transfer Moderate Assist   Toilet Transfers Maximal Assist   Skilled Intervention Verbal Cuing   Comments incontinent of urine with first sit to stand, gown changed, then needed emergent BM, max assist commode transfer, max assist clean up   How much difficulty does the patient currently have...   Turning over in bed (including adjusting bedclothes, sheets and blankets)? 2   Sitting down on and standing up from a chair with arms (e.g., wheelchair, bedside commode, etc.) 2   Moving from lying on back to sitting on the side of the bed? 2   How much help from another person does the patient currently need...   Moving to and from a bed to a chair (including a wheelchair)? 2   Need to walk in a hospital room? 2   Climbing 3-5 steps with a railing? 1   6 clicks Mobility Score 11   Activity Tolerance   Standing 5   Short Term Goals    Short Term Goal # 1 Pt will perform supine <> sit with SPV in 6 visits to get in/out of bed   Goal Outcome # 1 goal not met   Short Term Goal # 2 Pt will perform functional transfers with SPV in 6 visits to increase independence   Goal Outcome # 2 Goal not met   Short Term Goal # 3 Pt will ambulate 150ft with FWW and SPV in 6 visits to increase independence   Goal Outcome # 3 Goal not met   Education Group   Role of Physical Therapist Patient Response Patient;Acceptance;Explanation;Verbal Demonstration   Anticipated Discharge Equipment and Recommendations   DC Equipment Recommendations Unable to determine at this time   Discharge Recommendations Recommend post-acute placement for additional physical therapy services prior to discharge home   Interdisciplinary Plan of Care Collaboration   IDT Collaboration with  Nursing   Patient Position at End of Therapy In Bed;Tray Table within Reach;Bed Alarm On;Phone within Reach   Collaboration  Comments beverly updated   Session Information   Date / Session Number  1/31-2 (2/4, 2/3)

## 2022-01-31 NOTE — PROGRESS NOTES
Bedside report received.  Assessment complete.  A&O x 4. Patient calls appropriately.  Patient ambulates with x2 with FWW. Bed alarm on.  Patient has 1/10 pain. Pain managed with prescribed medications.  Denies N&V. Tolerating regular diet.  Scattered abrasions and bruising. BLE edema. Weakness to LUE, per pt baseline.  Chest tube to L flank. To water seal, dressing CDI.  + void, + flatus, last BM 1/29.  Patient denies SOB.  SCD's on.  Review plan with of care with patient. Call light and personal belongings with in reach. Hourly rounding in place. All needs met at this time.

## 2022-01-31 NOTE — CARE PLAN
Problem: Knowledge Deficit - Standard  Goal: Patient and family/care givers will demonstrate understanding of plan of care, disease process/condition, diagnostic tests and medications  Outcome: Progressing     Problem: Skin Integrity  Goal: Skin integrity is maintained or improved  Outcome: Progressing     Problem: Fall Risk  Goal: Patient will remain free from falls  Outcome: Progressing     Problem: Pain - Standard  Goal: Alleviation of pain or a reduction in pain to the patient’s comfort goal  Outcome: Progressing   The patient is Stable - Low risk of patient condition declining or worsening    Shift Goals  Clinical Goals: patient safety, O2 >90%  Patient Goals: rest  Family Goals: no family present    Progress made toward(s) clinical / shift goals:  Patient remained safe from falls. Oxygen saturation remained >90%.    Patient is not progressing towards the following goals:

## 2022-01-31 NOTE — DISCHARGE PLANNING
Agency/Facility Name: [facility]  Spoke To: Antonio  Outcome: No bed availability today, possibly tomorrow.    Agency/Facility Name: [facility]  Outcome: Left v-mail with Daja . This DPA awaiting call back.    QUINTEN Adam informed.

## 2022-01-31 NOTE — PROGRESS NOTES
Bedside report received.  Assessment complete.  A&O x 4. Patient calls appropriately.  Patient ambulates with two assist and FWW. Bed alarm on.   Patient has 3-4/10 pain. Pain managed with prescribed medications.  Denies N&V. Tolerating regular diet.  Chest tube DC'd by trauma..  + void, + flatus, + BM.  Patient denies SOB.  SCD's on.  Patient is pleasant and cooperative with the care plan.  Review plan with of care with patient. Call light and personal belongings within reach. Hourly rounding in place. All needs met at this time.  /48   Pulse 67   Temp 36.8 °C (98.2 °F) (Temporal)   Resp 17   Ht 1.829 m (6')   Wt 89.1 kg (196 lb 6.9 oz)   SpO2 95%   BMI 26.64 kg/m²

## 2022-01-31 NOTE — CARE PLAN
Problem: Hyperinflation  Goal: Prevent or improve atelectasis  Description: Target End Date:  3 to 4 days    1. Instruct incentive spirometry usage  2.  Perform hyperinflation therapy as indicated  Outcome: Progressing  Flowsheets (Taken 1/27/2022 1668 by Neema Contreras, RRT)  Hyperinflation Protocol Indications: Chest Trauma (Blunt, Penetrative, Crushing, or Surgical)

## 2022-01-31 NOTE — DISCHARGE PLANNING
Care Transition Team Discharge Planning    Anticipates discharge disposition:  • SNF    Action:  • Physiatry is recommending SNF. Per [facility] Pt has been accepted at [facility] and [facility] pending bed availability and Insurance Auth.  • Requested Michelle NUNEZ to follow up Pt's acceptance with SNF for bed availability and Insurance Auth    Pt has PASRR    Barriers to Discharge:  • Pending bed availability at SNF    Plan:  • CM to continue to assist Pt with discharge as needed

## 2022-01-31 NOTE — CONSULTS
"Reason for PC Consult: Advance Care Planning    Consulted by: JULIA Montemayor    Assessment:  General: Josse \"Jurgen\" Alirio is a 77 year-old male patient with a PMHx of HTN, HLD, BPH, and frequent falls. [facility]. He was found to have four left rib fractures, a left pneumothorax, and a questionable left hip fracture. On arrival at [facility], no acetabular fracture was identified on CT scan. A chest tube was inserted in the ICU.    Social: Jurgen lives at home alone in a single story home. He has one acre of land and previously enjoyed lanscaping and tending to this, though he hasn't been able to do so in quite some time. He reports that his functionality has been declining over the past year. He uses a walker to ambulate. He describes himself as an \"outdoors man\". He enjoys fishing and hunting for artifacts.    Consults: PM&R    Dyspnea: No  Last BM: 01/29/22  Pain: No  Depression: Mood appropriate for situation  Dementia: No    Spiritual:  Is Mormon or spirituality important for coping with this illness? No  Has a  or spiritual provider visit been requested? No    Palliative Performance Scale: 50%    Advance Directive: Advance Directive  DPOA: Yes- Michael MUELLER: Yes- DNR, selective treatment, no artificial nutrition    Code Status: DNR/DNI - Addressed at this encounter, previously full code    Outcome:  PC RN met with patient at bedside. Introduced self and role of palliative care. Offered to call patient's DPOA-HC, Michael, to involve him in the conversation as well. Jurgen declined and requests that PC RN call to update him afterwards. Patient updated RN on the social and medical history. Assessed Jurgen's understanding of current medical status, overall health picture, and options for future care. Demonstrates a good understanding of the current clinical picture.     Reviewed patient's AD, which names his friend, Michael Keenan, as " DPOA-HC. Jurgen would not like to make any changes to this today. Explored patient’s expressed values, beliefs, and preferences in order to identify GOC. Jurgen states that he would not want to live dependant upon other people long term. He is agreeable to going to a SNF temporarily if it is necessary for him to safely return home.     We discussed code status in detail and the measures employed in a full code including CPR (chest compressions & intubation), medications, and possibly defibrillation. Jurgen states that he would not want CPR attempted. Should his heart stop, he would want to be allowed to die peacefully and naturally. Also discussed the issue of intubation and mechanical ventilation, Jurgen verbalizes that he would not want this intervention either. APRN notified.    POLST completed at this encounter with pt choosing DNR, selective treatment/DNI, and no artificial nutrition. Signed by pt and APRN. This will be scanned into epic after completion and the original will be sent with the patient on discharge.    Active listening, reflection, reminiscing, validation & normalization, and empathic support utilized throughout this encounter.  All questions answered and contact information provided.    1300: PC RN attempted to call patient's DPOA-HC, Michael Keenan. LIYA requesting return call    Recommendations: I do not recommend an ethics or hospice consult at this time because pt continues to pursue continued treatment at this time.    Updated: APRN    Plan: Will continue to follow clinical picture and support pt. Update POA when able.     Thank you for allowing Palliative Care to participate in this patient's care. Please feel free to call [phone] with any questions or concerns.

## 2022-02-01 ENCOUNTER — APPOINTMENT (OUTPATIENT)
Dept: RADIOLOGY | Facility: MEDICAL CENTER | Age: 77
DRG: 200 | End: 2022-02-01
Payer: COMMERCIAL

## 2022-02-01 LAB
BASOPHILS # BLD AUTO: 0.6 % (ref 0–1.8)
BASOPHILS # BLD: 0.03 K/UL (ref 0–0.12)
EOSINOPHIL # BLD AUTO: 0.21 K/UL (ref 0–0.51)
EOSINOPHIL NFR BLD: 3.9 % (ref 0–6.9)
ERYTHROCYTE [DISTWIDTH] IN BLOOD BY AUTOMATED COUNT: 51.5 FL (ref 35.9–50)
HCT VFR BLD AUTO: 32 % (ref 42–52)
HGB BLD-MCNC: 10.4 G/DL (ref 14–18)
IMM GRANULOCYTES # BLD AUTO: 0.03 K/UL (ref 0–0.11)
IMM GRANULOCYTES NFR BLD AUTO: 0.6 % (ref 0–0.9)
LYMPHOCYTES # BLD AUTO: 0.79 K/UL (ref 1–4.8)
LYMPHOCYTES NFR BLD: 14.8 % (ref 22–41)
MCH RBC QN AUTO: 32.3 PG (ref 27–33)
MCHC RBC AUTO-ENTMCNC: 32.5 G/DL (ref 33.7–35.3)
MCV RBC AUTO: 99.4 FL (ref 81.4–97.8)
MONOCYTES # BLD AUTO: 0.56 K/UL (ref 0–0.85)
MONOCYTES NFR BLD AUTO: 10.5 % (ref 0–13.4)
NEUTROPHILS # BLD AUTO: 3.72 K/UL (ref 1.82–7.42)
NEUTROPHILS NFR BLD: 69.6 % (ref 44–72)
NRBC # BLD AUTO: 0 K/UL
NRBC BLD-RTO: 0 /100 WBC
PLATELET # BLD AUTO: 222 K/UL (ref 164–446)
PMV BLD AUTO: 9.5 FL (ref 9–12.9)
RBC # BLD AUTO: 3.22 M/UL (ref 4.7–6.1)
WBC # BLD AUTO: 5.3 K/UL (ref 4.8–10.8)

## 2022-02-01 PROCEDURE — 97112 NEUROMUSCULAR REEDUCATION: CPT | Mod: CO

## 2022-02-01 PROCEDURE — 97535 SELF CARE MNGMENT TRAINING: CPT | Mod: CO

## 2022-02-01 PROCEDURE — 71045 X-RAY EXAM CHEST 1 VIEW: CPT

## 2022-02-01 PROCEDURE — A9270 NON-COVERED ITEM OR SERVICE: HCPCS | Performed by: SURGERY

## 2022-02-01 PROCEDURE — 94669 MECHANICAL CHEST WALL OSCILL: CPT

## 2022-02-01 PROCEDURE — 85025 COMPLETE CBC W/AUTO DIFF WBC: CPT

## 2022-02-01 PROCEDURE — 700102 HCHG RX REV CODE 250 W/ 637 OVERRIDE(OP): Performed by: NURSE PRACTITIONER

## 2022-02-01 PROCEDURE — 700102 HCHG RX REV CODE 250 W/ 637 OVERRIDE(OP): Performed by: SURGERY

## 2022-02-01 PROCEDURE — 36415 COLL VENOUS BLD VENIPUNCTURE: CPT

## 2022-02-01 PROCEDURE — 770001 HCHG ROOM/CARE - MED/SURG/GYN PRIV*

## 2022-02-01 PROCEDURE — A9270 NON-COVERED ITEM OR SERVICE: HCPCS | Performed by: NURSE PRACTITIONER

## 2022-02-01 PROCEDURE — 700101 HCHG RX REV CODE 250: Performed by: SURGERY

## 2022-02-01 PROCEDURE — 700111 HCHG RX REV CODE 636 W/ 250 OVERRIDE (IP): Performed by: SURGERY

## 2022-02-01 PROCEDURE — 99231 SBSQ HOSP IP/OBS SF/LOW 25: CPT | Performed by: SURGERY

## 2022-02-01 RX ADMIN — SIMVASTATIN 20 MG: 10 TABLET, FILM COATED ORAL at 20:53

## 2022-02-01 RX ADMIN — ENOXAPARIN SODIUM 30 MG: 30 INJECTION SUBCUTANEOUS at 17:13

## 2022-02-01 RX ADMIN — TRAMADOL HYDROCHLORIDE 50 MG: 50 TABLET, COATED ORAL at 07:43

## 2022-02-01 RX ADMIN — TERAZOSIN HYDROCHLORIDE 5 MG: 5 CAPSULE ORAL at 20:53

## 2022-02-01 RX ADMIN — LISINOPRIL 10 MG: 5 TABLET ORAL at 05:41

## 2022-02-01 RX ADMIN — LIDOCAINE 1 PATCH: 50 PATCH TOPICAL at 05:41

## 2022-02-01 RX ADMIN — TRAMADOL HYDROCHLORIDE 50 MG: 50 TABLET, COATED ORAL at 20:52

## 2022-02-01 RX ADMIN — GABAPENTIN 100 MG: 100 CAPSULE ORAL at 05:41

## 2022-02-01 RX ADMIN — ENOXAPARIN SODIUM 30 MG: 30 INJECTION SUBCUTANEOUS at 05:40

## 2022-02-01 ASSESSMENT — COGNITIVE AND FUNCTIONAL STATUS - GENERAL
HELP NEEDED FOR BATHING: A LOT
TOILETING: A LOT
DRESSING REGULAR LOWER BODY CLOTHING: A LOT
DRESSING REGULAR UPPER BODY CLOTHING: A LITTLE
SUGGESTED CMS G CODE MODIFIER DAILY ACTIVITY: CK
DAILY ACTIVITIY SCORE: 16
PERSONAL GROOMING: A LITTLE

## 2022-02-01 ASSESSMENT — ENCOUNTER SYMPTOMS
CARDIOVASCULAR NEGATIVE: 1
MYALGIAS: 1
COUGH: 0
SHORTNESS OF BREATH: 0
CONSTIPATION: 0
GASTROINTESTINAL NEGATIVE: 1
CONSTITUTIONAL NEGATIVE: 1
NECK PAIN: 0
NEUROLOGICAL NEGATIVE: 1
BACK PAIN: 0
VOMITING: 0
EYES NEGATIVE: 1
ABDOMINAL PAIN: 0
NAUSEA: 0

## 2022-02-01 ASSESSMENT — PAIN DESCRIPTION - PAIN TYPE
TYPE: ACUTE PAIN

## 2022-02-01 ASSESSMENT — PATIENT HEALTH QUESTIONNAIRE - PHQ9
SUM OF ALL RESPONSES TO PHQ9 QUESTIONS 1 AND 2: 0
2. FEELING DOWN, DEPRESSED, IRRITABLE, OR HOPELESS: NOT AT ALL
1. LITTLE INTEREST OR PLEASURE IN DOING THINGS: NOT AT ALL

## 2022-02-01 ASSESSMENT — GAIT ASSESSMENTS: DISTANCE (FEET): 6

## 2022-02-01 NOTE — CARE PLAN
Problem: Knowledge Deficit - Standard  Goal: Patient and family/care givers will demonstrate understanding of plan of care, disease process/condition, diagnostic tests and medications  Outcome: Progressing     Problem: Skin Integrity  Goal: Skin integrity is maintained or improved  Outcome: Progressing     Problem: Fall Risk  Goal: Patient will remain free from falls  Outcome: Progressing     Problem: Pain - Standard  Goal: Alleviation of pain or a reduction in pain to the patient’s comfort goal  Outcome: Progressing   The patient is Stable - Low risk of patient condition declining or worsening    Shift Goals  Clinical Goals: patient safety, pain control  Patient Goals: rest  Family Goals: no family present    Progress made toward(s) clinical / shift goals:  patient remained free from falls and injury. Pain medication administered PRN.    Patient is not progressing towards the following goals:

## 2022-02-01 NOTE — PALLIATIVE CARE
Palliative Care follow-up  Return call received from patient's DPOA-HC, Michael. Discussed GOC discussion with Jurgen today and Michael reports that these wishes are in line with what he and Jurgen have discussed previously. Discussed Jurgen's desires to be DNR/DNI, which Michael also feels is in line with what they previously have discussed. All questions answered and contact information provided. Support provided for Michael.    Plan: Will continue to follow clinical picture and support pt.    Thank you for allowing Palliative Care to support this patient and family. Contact [phone] for additional assistance, change in patient status, or with any questions/concerns.

## 2022-02-01 NOTE — THERAPY
"Occupational Therapy  Daily Treatment     Patient Name: Jurgen Valdez  Age:  77 y.o., Sex:  male  Medical Record #: [id number]  Today's Date: 2/1/2022     Precautions  Precautions: (P) Fall Risk,Weight Bearing As Tolerated Left Lower Extremity    Assessment    Pt seen for OT  treatment. Pt pleasant and agreed to attempt self care tasks. Pt presents with a flat affect, gave little eye contact and needs extended time for most tasks and for MP today.Reviewed WBAT LLE .  Pt required Mod A for bed mobility and for supine to sit at EOB with extended time. Posterior leaning demo. Max A for scooting to EOB. Min A for UB dressing, changing gown. Mod A for STS and Max A X2 for transfers to BSC. Pt demo posterior leaning in standing as well.  Pt having BM in bed unaware. Pt demo only fair safety awareness attempting to sit prior to being fully in front of BSC.needing verbal cues and tactile cues not to sit until turn is complete. Pt missed the bucket and upset slightly that he BM on the floor. Informed pt that others had accidents as well. Pt required Total A for full toilet hygiene in standing. Pt stated, \"I can't wipe myself.\" When asked who helps him at home,  pt did not respond. Mod A for STS from BSC and Max A X2 for back to sit at EOB. Pt declined to stay up in chair. Pt had a visitor at end of session. Mod A for BTB . RN updated on OT treatment findings and recommendations. Will continue OT POC.            Plan    Continue current treatment plan.    DC Equipment Recommendations: (P) Grab Bar(s) by Toilet,Grab Bar(s) in Tub / Shower,Unable to determine at this time  Discharge Recommendations: (P) Recommend post-acute placement for additional occupational therapy services prior to discharge home.    Subjective    \"I will try to go to the BR\",\" I think it is about time today\". Pt agreed to BSC transfers.      Objective       02/01/22 1230   Cognition    Cognition / Consciousness X   Level of Consciousness Alert   Comments " pleasant, flat affect , little eye contact, decreased safety awareness with transfers.    Passive ROM Upper Body   Comments LUE limited, RUE WFL   Active ROM Upper Body   Dominant Hand Right   Comments LUE limited at baseline; RUE WFL.    Strength Upper Body   Upper Body Strength  X   Gross Strength Generalized Weakness, Equal Bilaterally.    Comments easily fatigues   Other Treatments   Other Treatments Provided Psychosocial intervention addressed.    Balance   Sitting Balance (Static) Fair -   Sitting Balance (Dynamic) Fair   Standing Balance (Static) Trace +   Standing Balance (Dynamic) Trace +   Weight Shift Sitting Poor   Weight Shift Standing Poor   Skilled Intervention Verbal Cuing;Tactile Cuing;Sequencing;Postural Facilitation;Compensatory Strategies   Comments Pt presents with a posterior lean in sitting and standing.    Bed Mobility    Supine to Sit Moderate Assist   Sit to Supine Moderate Assist   Scooting Moderate Assist   Rolling Minimum Assist to Lt.   Skilled Intervention Verbal Cuing;Tactile Cuing;Sequencing;Postural Facilitation;Compensatory Strategies   Comments HOB elevated and use of bedrail    Activities of Daily Living   Eating Supervision   Grooming Minimal Assist;Seated  (refused to do hair. )   Bathing   (declined to attempt. )   Upper Body Dressing Minimal Assist   Lower Body Dressing Maximal Assist   Toileting Total Assist  ( BM on floor missing [facility] bucket.Total A for full hygiene)   Skilled Intervention Verbal Cuing;Tactile Cuing;Sequencing;Postural Facilitation;Compensatory Strategies   Comments Pt will need assist for most self care tasks and ADL transfers.    Functional Mobility   Sit to Stand Maximal Assist   Bed, Chair, Wheelchair Transfer Maximal Assist   Toilet Transfers Maximal Assist  (X2 to/from  [facility])   Skilled Intervention Verbal Cuing;Tactile Cuing;Sequencing;Postural Facilitation;Compensatory Strategies   Comments Posterior leaning in standing. Pt incontinent of BM in bed  unaware. Mod A for STS and Max A for BSC transfer. Pt missed bucket. Total A for full toilet hygiene in standing.    Activity Tolerance   Comments low activity tolerance.    Patient / Family Goals   Patient / Family Goal #1 home ASAP   Goal #1 Outcome Goal not met   Short Term Goals   Short Term Goal # 1 pt will demo functional transfer with SPV   Goal Outcome # 1 Goal not met   Short Term Goal # 2 pt will complete toileting ADL at SPV level   Goal Outcome # 2 Goal not met   Short Term Goal # 3 pt will tolerate >5min standing grooming at sink with SPV   Goal Outcome # 3 Goal not met   Anticipated Discharge Equipment and Recommendations   DC Equipment Recommendations Grab Bar(s) by Toilet;Grab Bar(s) in Tub / Shower;Unable to determine at this time   Discharge Recommendations Recommend post-acute placement for additional occupational therapy services prior to discharge home   Interdisciplinary Plan of Care Collaboration   IDT Collaboration with  Nursing;Certified Nursing Assistant   Collaboration Comments RN updated

## 2022-02-01 NOTE — PROGRESS NOTES
Bedside report received.  Assessment complete.  A&O x 4. Patient calls appropriately.  Patient ambulates with one assist and FWW. Bed alarm on.   Patient has 0-10/10 pain. Pain managed with prescribed medications.  Denies N&V. Tolerating regular diet.  Surgical dressing CDI.  + void, + flatus, + BM.  Patient denies SOB.  SCD's on.  Patient is pleasant and cooperative with the care plan.  Review plan with of care with patient. Call light and personal belongings within reach. Hourly rounding in place. All needs met at this time.  /59   Pulse 66   Temp 36.4 °C (97.6 °F) (Temporal)   Resp 18   Ht 1.829 m (6')   Wt 89.1 kg (196 lb 6.9 oz)   SpO2 92%   BMI 26.64 kg/m²

## 2022-02-01 NOTE — PROGRESS NOTES
Trauma / Surgical Daily Progress Note    Date of Service  2/1/2022    Chief Complaint  76 y.o. male admitted 1/26/2022 post ground level fall. He sustained 4 left sided rib fractures, a left pneumothorax, and left hip injury.     Interval Events  No critical events overnight.  Adequate pain control.  Chest tube removed - 2 sutures remain.  RA. IS [id number].  Tolerating diet.    - CXR and CBC MWF.  - Aggressive pulmonary hygiene.  - Encourage out of bed and mobilization.  - Disposition: Bed available on Monday 2/7.  - Counseled.     Review of Systems  Review of Systems   Constitutional: Negative.    HENT: Negative.    Eyes: Negative.    Respiratory: Negative for cough and shortness of breath.    Cardiovascular: Negative.    Gastrointestinal: Negative.  Negative for abdominal pain, constipation (Last BM 1/31), nausea and vomiting.   Genitourinary: Negative.  Negative for dysuria.   Musculoskeletal: Positive for myalgias (rib fx left). Negative for back pain, joint pain and neck pain.   Skin: Negative.    Neurological: Negative.         Vital Signs  Temp:  [36.3 °C (97.4 °F)-36.7 °C (98 °F)] 36.7 °C (98 °F)  Pulse:  [60-66] 60  Resp:  [16-18] 17  BP: (131-144)/(58-61) 139/58  SpO2:  [90 %-94 %] 94 %    Physical Exam  Physical Exam  Vitals and nursing note reviewed.   Constitutional:       General: He is awake. He is not in acute distress.     Appearance: He is not ill-appearing, toxic-appearing or diaphoretic.   HENT:      Head: Normocephalic.      Right Ear: External ear normal.      Left Ear: External ear normal.      Nose: Nose normal.      Mouth/Throat:      Mouth: Mucous membranes are moist.      Pharynx: Oropharynx is clear.   Eyes:      General: No scleral icterus.     Extraocular Movements: Extraocular movements intact.      Conjunctiva/sclera: Conjunctivae normal.   Cardiovascular:      Rate and Rhythm: Normal rate and regular rhythm.      Pulses: Normal pulses.   Pulmonary:      Effort: Pulmonary effort is  normal. No respiratory distress.      Breath sounds: No stridor. No rhonchi.      Comments: IS [id number]. RA    Chest:      Chest wall: Tenderness (Left chesrt wall) present.   Abdominal:      General: Abdomen is flat. Bowel sounds are normal. There is no distension.      Palpations: Abdomen is soft.      Tenderness: There is no abdominal tenderness. There is no guarding.   Genitourinary:     Comments: Using urinal.  Musculoskeletal:         General: No swelling or tenderness. Normal range of motion.      Cervical back: Normal range of motion and neck supple.   Skin:     General: Skin is warm and dry.      Capillary Refill: Capillary refill takes 2 to 3 seconds.      Comments: Abrasion left, right elbow   Neurological:      Mental Status: He is alert and easily aroused.      GCS: GCS eye subscore is 4. GCS verbal subscore is 5. GCS motor subscore is 6.   Psychiatric:         Mood and Affect: Mood normal.         Behavior: Behavior normal. Behavior is cooperative.         Laboratory  Recent Results (from the past 24 hour(s))   CBC WITH DIFFERENTIAL    Collection Time: 02/01/22  3:30 AM   Result Value Ref Range    WBC 5.3 4.8 - 10.8 K/uL    RBC 3.22 (L) 4.70 - 6.10 M/uL    Hemoglobin 10.4 (L) 14.0 - 18.0 g/dL    Hematocrit 32.0 (L) 42.0 - 52.0 %    MCV 99.4 (H) 81.4 - 97.8 fL    MCH 32.3 27.0 - 33.0 pg    MCHC 32.5 (L) 33.7 - 35.3 g/dL    RDW 51.5 (H) 35.9 - 50.0 fL    Platelet Count 222 164 - 446 K/uL    MPV 9.5 9.0 - 12.9 fL    Neutrophils-Polys 69.60 44.00 - 72.00 %    Lymphocytes 14.80 (L) 22.00 - 41.00 %    Monocytes 10.50 0.00 - 13.40 %    Eosinophils 3.90 0.00 - 6.90 %    Basophils 0.60 0.00 - 1.80 %    Immature Granulocytes 0.60 0.00 - 0.90 %    Nucleated RBC 0.00 /100 WBC    Neutrophils (Absolute) 3.72 1.82 - 7.42 K/uL    Lymphs (Absolute) 0.79 (L) 1.00 - 4.80 K/uL    Monos (Absolute) 0.56 0.00 - 0.85 K/uL    Eos (Absolute) 0.21 0.00 - 0.51 K/uL    Baso (Absolute) 0.03 0.00 - 0.12 K/uL    Immature  Granulocytes (abs) 0.03 0.00 - 0.11 K/uL    NRBC (Absolute) 0.00 K/uL       Fluids    Intake/Output Summary (Last 24 hours) at 2/1/2022 1834  Last data filed at 2/1/2022 1715  Gross per 24 hour   Intake 540 ml   Output 1750 ml   Net -1210 ml       Core Measures & Quality Metrics  Radiology images reviewed, Labs reviewed and Medications reviewed  Maya catheter: No Maya      DVT Prophylaxis: Enoxaparin (Lovenox)  DVT prophylaxis - mechanical: SCDs  Ulcer prophylaxis: Not indicated    Assessed for rehab: Patient was assess for and/or received rehabilitation services during this hospitalization    RAP Score Total: 6    Assesment ETOH: Admission diagnostic alcohol < 10.1  Denies alcohol abuse.         Assessment/Plan  Traumatic pneumothorax- (present on admission)  Assessment & Plan  Small left pneumothorax.   Left chest tube inserted in ICU  1/28 No pneumothorax on AM CXR.  Chest tube to suction with no air leak.  1/29 AM CXR with no pneumothorax, worsening opacities in the bilateral lower lungs and worsening atelectasis.   1/30 AM CXR with bilateral lower lobe infiltrates, right greater than left.   1/31 AM CXR with stable bilateral infiltrates.   - Discontinue chest tube -  2 sutures remain.  2/1 CXR without pneumothorax  CXR MWF    Multiple rib fractures involving four or more ribs- (present on admission)  Assessment & Plan  Fractures of the 3rd, 4th, 5th, and 6th left ribs.  1/30 CXR with bilateral lower lobe infiltrates, right greater than left.  - PEP therapy added.   2/1 CXR with infiltrates, improving.  Aggressive multimodal pain management, and pulmonary hygiene.   CXR MWF    Discharge planning issues- (present on admission)  Assessment & Plan  Date of admission: 1/26/2022.  1/28/2022 Transfer orders from SICU.  1/28/2022 Rehab recommending SNF. Patient has been accepted to a few SNFs. Waiting on bed availability.    Cleared for discharge: Yes - Date: 2/1.   Discharge delayed: Yes - Reason: Bed  availability.  Discharge date: tbd.     Frequent falls- (present on admission)  Assessment & Plan  PT/OT eval complete.  Recommending post acute placement.    Hypertension- (present on admission)  Assessment & Plan  Chronic condition treated with lisinopril, metoprolol.  Resumed maintenance medication on admission.   1/28 DC metoprolol secondary to bradycardia.  Consider resumption of metoprolol as clinically indicated.    BPH (benign prostatic hyperplasia)- (present on admission)  Assessment & Plan  Chronic condition treated with terazosin.  Resumed maintenance medication on admission.     Hyperlipidemia- (present on admission)  Assessment & Plan  Chronic condition treated with simvastatin.  Resumed maintenance medication on admission.    Left hip pain- (present on admission)  Assessment & Plan  Report of [facility]  No fracture seen on review by Dr Baeza.  Weight bearing status - Weightbearing as tolerated   Seth Baeza MD. Orthopedic Surgeon. [facility].      No contraindication to deep vein thrombosis (DVT) prophylaxis- (present on admission)  Assessment & Plan  Prophylactic dose enoxaparin initiated upon admission.     Trauma- (present on admission)  Assessment & Plan  Fall from a couch one night prior to presentation.   Found down by a neighbor after 6-7 hrs.  History of frequent falls  Trauma Green Transfer Activation.  Lee Grimaldo MD. Trauma Surgery.      Discussed patient condition with RN, Patient and trauma surgery, Dr. Lee Maxwell.

## 2022-02-01 NOTE — DISCHARGE PLANNING
Agency/Facility Name: [facility]  Outcome: Left v-mail with [facility] admissions. This DPA awaiting call back.     Addendum @6682:  Agency[facility]Facility [facility]: [facility]  Spoke To: Melody  Outcome: No beds available until Monday February 7th for both facilities.     Addendum @1120:  Agency[facility]Facility Name: [facility]  Spoke To: Antonio  Outcome: No beds available until Monday the 7th due to COVID outbreak.     QUINTEN Adam informed.

## 2022-02-01 NOTE — PALLIATIVE CARE
Palliative Care follow-up  PC RN met with patient at bedside, he is working with occupational therapy currently. Friend, Michael, at bedside as well. POLST hung above the bed, a copy will be scanned into Epic. All questions answered.     Plan: Will continue to follow clinical picture and support pt/friend.    Thank you for allowing Palliative Care to support this patient and family. Contact [phone] for additional assistance, change in patient status, or with any questions/concerns.

## 2022-02-01 NOTE — PROGRESS NOTES
AxOx4. On room air.  Denies pain/SOB/chest pain/numbness or tingling.  L flank drg from previous chest tube.  Tolerating diet. Denies N/V.  LBM 1/31, +void.  Ambulates with 2 person assist with use of a FWW.  SCDs on. Lovenox in use.

## 2022-02-02 ENCOUNTER — APPOINTMENT (OUTPATIENT)
Dept: RADIOLOGY | Facility: MEDICAL CENTER | Age: 77
DRG: 200 | End: 2022-02-02
Attending: NURSE PRACTITIONER
Payer: COMMERCIAL

## 2022-02-02 PROBLEM — S27.0XXA TRAUMATIC PNEUMOTHORAX: Status: RESOLVED | Noted: 2022-01-27 | Resolved: 2022-02-02

## 2022-02-02 LAB
BASOPHILS # BLD AUTO: 0.8 % (ref 0–1.8)
BASOPHILS # BLD: 0.04 K/UL (ref 0–0.12)
EOSINOPHIL # BLD AUTO: 0.21 K/UL (ref 0–0.51)
EOSINOPHIL NFR BLD: 4 % (ref 0–6.9)
ERYTHROCYTE [DISTWIDTH] IN BLOOD BY AUTOMATED COUNT: 51 FL (ref 35.9–50)
HCT VFR BLD AUTO: 32.4 % (ref 42–52)
HGB BLD-MCNC: 10.6 G/DL (ref 14–18)
IMM GRANULOCYTES # BLD AUTO: 0.08 K/UL (ref 0–0.11)
IMM GRANULOCYTES NFR BLD AUTO: 1.5 % (ref 0–0.9)
LYMPHOCYTES # BLD AUTO: 0.85 K/UL (ref 1–4.8)
LYMPHOCYTES NFR BLD: 16.3 % (ref 22–41)
MCH RBC QN AUTO: 32.7 PG (ref 27–33)
MCHC RBC AUTO-ENTMCNC: 32.7 G/DL (ref 33.7–35.3)
MCV RBC AUTO: 100 FL (ref 81.4–97.8)
MONOCYTES # BLD AUTO: 0.68 K/UL (ref 0–0.85)
MONOCYTES NFR BLD AUTO: 13.1 % (ref 0–13.4)
NEUTROPHILS # BLD AUTO: 3.34 K/UL (ref 1.82–7.42)
NEUTROPHILS NFR BLD: 64.3 % (ref 44–72)
NRBC # BLD AUTO: 0 K/UL
NRBC BLD-RTO: 0 /100 WBC
PLATELET # BLD AUTO: 239 K/UL (ref 164–446)
PMV BLD AUTO: 9.6 FL (ref 9–12.9)
RBC # BLD AUTO: 3.24 M/UL (ref 4.7–6.1)
WBC # BLD AUTO: 5.2 K/UL (ref 4.8–10.8)

## 2022-02-02 PROCEDURE — 99231 SBSQ HOSP IP/OBS SF/LOW 25: CPT | Performed by: SURGERY

## 2022-02-02 PROCEDURE — 770001 HCHG ROOM/CARE - MED/SURG/GYN PRIV*

## 2022-02-02 PROCEDURE — 85025 COMPLETE CBC W/AUTO DIFF WBC: CPT

## 2022-02-02 PROCEDURE — 700111 HCHG RX REV CODE 636 W/ 250 OVERRIDE (IP): Performed by: SURGERY

## 2022-02-02 PROCEDURE — 71045 X-RAY EXAM CHEST 1 VIEW: CPT

## 2022-02-02 PROCEDURE — 700101 HCHG RX REV CODE 250: Performed by: SURGERY

## 2022-02-02 PROCEDURE — 700102 HCHG RX REV CODE 250 W/ 637 OVERRIDE(OP): Performed by: SURGERY

## 2022-02-02 PROCEDURE — 700102 HCHG RX REV CODE 250 W/ 637 OVERRIDE(OP): Performed by: NURSE PRACTITIONER

## 2022-02-02 PROCEDURE — A9270 NON-COVERED ITEM OR SERVICE: HCPCS | Performed by: NURSE PRACTITIONER

## 2022-02-02 PROCEDURE — A9270 NON-COVERED ITEM OR SERVICE: HCPCS | Performed by: SURGERY

## 2022-02-02 PROCEDURE — 94669 MECHANICAL CHEST WALL OSCILL: CPT

## 2022-02-02 PROCEDURE — 97116 GAIT TRAINING THERAPY: CPT

## 2022-02-02 PROCEDURE — 36415 COLL VENOUS BLD VENIPUNCTURE: CPT

## 2022-02-02 RX ADMIN — TRAMADOL HYDROCHLORIDE 50 MG: 50 TABLET, COATED ORAL at 08:39

## 2022-02-02 RX ADMIN — TRAMADOL HYDROCHLORIDE 50 MG: 50 TABLET, COATED ORAL at 20:49

## 2022-02-02 RX ADMIN — TERAZOSIN HYDROCHLORIDE 5 MG: 5 CAPSULE ORAL at 20:48

## 2022-02-02 RX ADMIN — LIDOCAINE 1 PATCH: 50 PATCH TOPICAL at 04:51

## 2022-02-02 RX ADMIN — LISINOPRIL 10 MG: 5 TABLET ORAL at 04:50

## 2022-02-02 RX ADMIN — ENOXAPARIN SODIUM 30 MG: 30 INJECTION SUBCUTANEOUS at 17:16

## 2022-02-02 RX ADMIN — TRAMADOL HYDROCHLORIDE 50 MG: 50 TABLET, COATED ORAL at 04:50

## 2022-02-02 RX ADMIN — SIMVASTATIN 20 MG: 10 TABLET, FILM COATED ORAL at 20:48

## 2022-02-02 RX ADMIN — ENOXAPARIN SODIUM 30 MG: 30 INJECTION SUBCUTANEOUS at 04:50

## 2022-02-02 ASSESSMENT — PATIENT HEALTH QUESTIONNAIRE - PHQ9
2. FEELING DOWN, DEPRESSED, IRRITABLE, OR HOPELESS: NOT AT ALL
1. LITTLE INTEREST OR PLEASURE IN DOING THINGS: NOT AT ALL
SUM OF ALL RESPONSES TO PHQ9 QUESTIONS 1 AND 2: 0

## 2022-02-02 ASSESSMENT — ENCOUNTER SYMPTOMS
CONSTIPATION: 0
NECK PAIN: 0
DOUBLE VISION: 0
CHILLS: 0
SHORTNESS OF BREATH: 0
MYALGIAS: 1
BACK PAIN: 0
HEADACHES: 0
SENSORY CHANGE: 0
TINGLING: 0
ABDOMINAL PAIN: 0
DIZZINESS: 0
VOMITING: 0
FOCAL WEAKNESS: 0
SPEECH CHANGE: 0
FEVER: 0
NAUSEA: 0
BLURRED VISION: 0

## 2022-02-02 ASSESSMENT — GAIT ASSESSMENTS
GAIT LEVEL OF ASSIST: STANDBY ASSIST
ASSISTIVE DEVICE: FRONT WHEEL WALKER
DISTANCE (FEET): 80

## 2022-02-02 ASSESSMENT — COGNITIVE AND FUNCTIONAL STATUS - GENERAL
MOBILITY SCORE: 11
SUGGESTED CMS G CODE MODIFIER MOBILITY: CL
STANDING UP FROM CHAIR USING ARMS: A LITTLE
WALKING IN HOSPITAL ROOM: A LITTLE
CLIMB 3 TO 5 STEPS WITH RAILING: A LOT
TURNING FROM BACK TO SIDE WHILE IN FLAT BAD: UNABLE
MOVING TO AND FROM BED TO CHAIR: UNABLE
MOVING FROM LYING ON BACK TO SITTING ON SIDE OF FLAT BED: UNABLE

## 2022-02-02 ASSESSMENT — PAIN DESCRIPTION - PAIN TYPE
TYPE: ACUTE PAIN

## 2022-02-02 NOTE — CARE PLAN
Problem: Knowledge Deficit - Standard  Goal: Patient and family/care givers will demonstrate understanding of plan of care, disease process/condition, diagnostic tests and medications  Outcome: Progressing     Problem: Skin Integrity  Goal: Skin integrity is maintained or improved  Outcome: Progressing     Problem: Fall Risk  Goal: Patient will remain free from falls  Outcome: Progressing     Problem: Pain - Standard  Goal: Alleviation of pain or a reduction in pain to the patient’s comfort goal  Outcome: Progressing   The patient is Stable - Low risk of patient condition declining or worsening    Shift Goals  Clinical Goals: pt safety, up to chair  Patient Goals: rest  Family Goals: no family present    Progress made toward(s) clinical / shift goals:  Patient remained safe from falls or injury. Patient was up to chair for breakfast and lunch.    Patient is not progressing towards the following goals:

## 2022-02-02 NOTE — THERAPY
Physical Therapy   Daily Treatment     Patient Name: Jurgen Valdez  Age:  77 y.o., Sex:  male  Medical Record #: [id number]  Today's Date: 2/2/2022     Precautions  Precautions: Fall Risk;Weight Bearing As Tolerated Left Lower Extremity    Assessment    Recd pt sitting in bedside chair.  Agreeable to work w/ PT.  Mod assist to stand.  Able to maintain standing balance w/ fww and spv.  Ambulated 80 ft w/ fww, w/o loss of balance.  Cues to increase step length and heel strike on the left.  Returned to bedside chair per nsg request.      Plan    Continue current treatment plan.    DC Equipment Recommendations: Unable to determine at this time  Discharge Recommendations: Recommend post-acute placement for additional physical therapy services prior to discharge home        Objective       02/02/22 1221   Balance   Sitting Balance (Static) Fair   Sitting Balance (Dynamic) Fair   Standing Balance (Static) Fair -   Standing Balance (Dynamic) Fair -   Weight Shift Sitting Fair   Weight Shift Standing Fair   Skilled Intervention Verbal Cuing   Comments w/ fww   Gait Analysis   Gait Level Of Assist Standby Assist   Assistive Device Front Wheel Walker   Distance (Feet) 80   Deviation   (decreased step length and heel strike LLE)   Weight Bearing Status WBAT L LE   Skilled Intervention Verbal Cuing   Bed Mobility    Comments not assessed rec'd in chair and returned to chair per nsg request   Functional Mobility   Sit to Stand Moderate Assist   Bed, Chair, Wheelchair Transfer Supervised   Skilled Intervention Verbal Cuing;Tactile Cuing;Facilitation   Short Term Goals    Short Term Goal # 2 Pt will perform functional transfers with SPV in 6 visits to increase independence   Goal Outcome # 2 Goal not met   Short Term Goal # 3 Pt will ambulate 150ft with FWW and SPV in 6 visits to increase independence   Goal Outcome # 3 Goal not met   Anticipated Discharge Equipment and Recommendations   DC Equipment Recommendations Unable to  determine at this time   Discharge Recommendations Recommend post-acute placement for additional physical therapy services prior to discharge home

## 2022-02-02 NOTE — PROGRESS NOTES
Bedside report received.  Assessment complete.  A&O x 4. Patient calls appropriately.  Patient ambulates with two assist and FWW. Bed alarm on.   Patient has 0-8/10 pain. Pain managed with prescribed medications.  Denies N&V. Tolerating regular diet.  Surgical dressing CDI.  + void, + flatus, + BM.  Patient denies SOB.  SCD's on.  Patient is pleasant and cooperative with the care plan.  Review plan with of care with patient. Call light and personal belongings within reach. Hourly rounding in place. All needs met at this time.  /52   Pulse (!) 56   Temp 36.3 °C (97.3 °F) (Temporal)   Resp 17   Ht 1.829 m (6')   Wt 89.1 kg (196 lb 6.9 oz)   SpO2 90%   BMI 26.64 kg/m²

## 2022-02-02 NOTE — DISCHARGE PLANNING
Anticipated Discharge Disposition: SNF    Action: Discussed this case with Maria Esther Gonzales. Per Janet, patient has a Chest X-Ray scheduled for today. LSW reported [facility] is expected to have a bed Monday February 7, 2022.    Barriers to Discharge: SNF Bed.     Plan: SNF, pending bed availability.

## 2022-02-02 NOTE — PROGRESS NOTES
AxOx4. On 0.5L oxygen via nasal cannula.  Denies pain/SOB/chest pain/numbness or tingling.  L flank drg from previous chest tube.  Tolerating diet. Denies N/V.  LBM 2/1, +void.  Ambulates with 2 person assist with use of a FWW.  SCDs on. Lovenox in use.

## 2022-02-02 NOTE — PROGRESS NOTES
Trauma / Surgical Daily Progress Note    Date of Service  2/2/2022    Chief Complaint  77 y.o. male admitted 1/26/2022 with multiple left rib fractures and small left pneumothorax after a GLF  POD # 6 Left rube thoracostomy    Interval Events  Up to chair for breakfast, no complaints  Mildly worsening CXR    - Add IS and PEP therapy  - Continue aggressive pulmonary hygiene and mobilization  - Remains medically cleared for SNF placement, anticipate available bed Monday, 2/7    Review of Systems  Review of Systems   Constitutional: Negative for chills and fever.   HENT: Negative for hearing loss.    Eyes: Negative for blurred vision and double vision.   Respiratory: Negative for shortness of breath.    Cardiovascular: Negative for chest pain.   Gastrointestinal: Negative for abdominal pain, constipation (Last BM 2/1), nausea and vomiting.   Genitourinary: Negative for dysuria (voiding).   Musculoskeletal: Positive for myalgias (left chest wall). Negative for back pain, joint pain and neck pain.   Skin: Negative for rash.   Neurological: Negative for dizziness, tingling, sensory change, speech change, focal weakness and headaches.        Vital Signs  Temp:  [36.2 °C (97.2 °F)-36.7 °C (98 °F)] 36.3 °C (97.3 °F)  Pulse:  [56-64] 56  Resp:  [17] 17  BP: (112-139)/(52-62) 112/52  SpO2:  [90 %-94 %] 90 %    Physical Exam  Physical Exam  Vitals and nursing note reviewed.   Constitutional:       General: He is awake. He is not in acute distress.     Appearance: He is well-developed. He is not ill-appearing.      Comments: Up to chair   HENT:      Head: Normocephalic and atraumatic.      Right Ear: External ear normal.      Left Ear: External ear normal.      Nose: Nose normal.      Mouth/Throat:      Mouth: Mucous membranes are moist.      Pharynx: Oropharynx is clear.   Eyes:      Pupils: Pupils are equal, round, and reactive to light.   Pulmonary:      Effort: Pulmonary effort is normal. No respiratory distress.    Chest:      Chest wall: Tenderness (left chest wall) present.   Abdominal:      General: Abdomen is flat. Bowel sounds are normal. There is no distension.      Palpations: Abdomen is soft.      Tenderness: There is no abdominal tenderness. There is no guarding.   Musculoskeletal:      Cervical back: Neck supple.      Comments: Moves all extremities   Skin:     General: Skin is warm and dry.      Comments: Scattered abrasions   Neurological:      Mental Status: He is alert.      GCS: GCS eye subscore is 4. GCS verbal subscore is 5. GCS motor subscore is 6.   Psychiatric:         Mood and Affect: Mood normal.         Behavior: Behavior normal. Behavior is cooperative.         Laboratory  Recent Results (from the past 24 hour(s))   CBC WITH DIFFERENTIAL    Collection Time: 02/02/22  6:00 AM   Result Value Ref Range    WBC 5.2 4.8 - 10.8 K/uL    RBC 3.24 (L) 4.70 - 6.10 M/uL    Hemoglobin 10.6 (L) 14.0 - 18.0 g/dL    Hematocrit 32.4 (L) 42.0 - 52.0 %    .0 (H) 81.4 - 97.8 fL    MCH 32.7 27.0 - 33.0 pg    MCHC 32.7 (L) 33.7 - 35.3 g/dL    RDW 51.0 (H) 35.9 - 50.0 fL    Platelet Count 239 164 - 446 K/uL    MPV 9.6 9.0 - 12.9 fL    Neutrophils-Polys 64.30 44.00 - 72.00 %    Lymphocytes 16.30 (L) 22.00 - 41.00 %    Monocytes 13.10 0.00 - 13.40 %    Eosinophils 4.00 0.00 - 6.90 %    Basophils 0.80 0.00 - 1.80 %    Immature Granulocytes 1.50 (H) 0.00 - 0.90 %    Nucleated RBC 0.00 /100 WBC    Neutrophils (Absolute) 3.34 1.82 - 7.42 K/uL    Lymphs (Absolute) 0.85 (L) 1.00 - 4.80 K/uL    Monos (Absolute) 0.68 0.00 - 0.85 K/uL    Eos (Absolute) 0.21 0.00 - 0.51 K/uL    Baso (Absolute) 0.04 0.00 - 0.12 K/uL    Immature Granulocytes (abs) 0.08 0.00 - 0.11 K/uL    NRBC (Absolute) 0.00 K/uL       Fluids    Intake/Output Summary (Last 24 hours) at 2/2/2022 1405  Last data filed at 2/2/2022 0930  Gross per 24 hour   Intake 660 ml   Output 1100 ml   Net -440 ml       Core Measures & Quality Metrics  Radiology images reviewed,  Labs reviewed and Medications reviewed  Maya catheter: No Maya      DVT Prophylaxis: Enoxaparin (Lovenox)  DVT prophylaxis - mechanical: SCDs  Ulcer prophylaxis: Not indicated    Assessed for rehab: Patient was assess for and/or received rehabilitation services during this hospitalization    RAP Score Total: 6    ETOH Screening  CAGE Score: 0  Assessment complete date: 2/1/2022 (Admission BA negative, CAGE negative)        Assessment/Plan  Discharge planning issues- (present on admission)  Assessment & Plan  Date of admission: 1/26/2022.  1/28 Transfer orders from SICU.  1/28 Rehab recommending SNF. Patient has been accepted to a few SNFs. Waiting on bed availability.  2/2 SNF will have a bed available Monday, 2/7.  Cleared for discharge: Yes - Date: 2/1.   Discharge delayed: Yes - Reason: Bed availability.  Discharge date: tbd.    Frequent falls- (present on admission)  Assessment & Plan  PT/OT eval complete.  Recommending post acute placement.    Hypertension- (present on admission)  Assessment & Plan  Chronic condition treated with lisinopril, metoprolol.  Resumed maintenance medication on admission.   1/28 DC metoprolol secondary to bradycardia.  Consider resumption of metoprolol as clinically indicated.    Multiple rib fractures involving four or more ribs- (present on admission)  Assessment & Plan  Fractures of the 3rd, 4th, 5th, and 6th left ribs.  Aggressive pulmonary hygiene and multimodal pain management.    BPH (benign prostatic hyperplasia)- (present on admission)  Assessment & Plan  Chronic condition treated with terazosin.  Resumed maintenance medication on admission.     Hyperlipidemia- (present on admission)  Assessment & Plan  Chronic condition treated with simvastatin.  Resumed maintenance medication on admission.    Left hip pain- (present on admission)  Assessment & Plan  Report of left hip fracture on read from [facility] imaging.  No fracture seen on review by Dr Baeza.  Weight bearing status -  Weightbearing as tolerated LLE.  Seth Baeza MD. Orthopedic Surgeon. [facility].    No contraindication to deep vein thrombosis (DVT) prophylaxis- (present on admission)  Assessment & Plan  Prophylactic dose enoxaparin initiated upon admission.     Trauma- (present on admission)  Assessment & Plan  Fall from a couch one night prior to presentation.   Found down by a neighbor after 6-7 hrs.  History of frequent falls  Trauma Green Transfer Activation.  Lee Grimaldo MD. Trauma Surgery.      Discussed patient condition with RN, , , Patient and trauma surgery. Dr. Maxwell

## 2022-02-02 NOTE — CARE PLAN
Problem: Skin Integrity  Goal: Skin integrity is maintained or improved  Outcome: Progressing     Problem: Fall Risk  Goal: Patient will remain free from falls  Outcome: Progressing     Problem: Pain - Standard  Goal: Alleviation of pain or a reduction in pain to the patient’s comfort goal  Outcome: Progressing   The patient is Stable - Low risk of patient condition declining or worsening    Shift Goals  Clinical Goals: safety; OOB activities  Patient Goals: rest; pain control  Family Goals: no family present    Progress made toward(s) clinical / shift goals:  yes

## 2022-02-03 PROCEDURE — 700101 HCHG RX REV CODE 250: Performed by: SURGERY

## 2022-02-03 PROCEDURE — 94669 MECHANICAL CHEST WALL OSCILL: CPT

## 2022-02-03 PROCEDURE — 700102 HCHG RX REV CODE 250 W/ 637 OVERRIDE(OP): Performed by: NURSE PRACTITIONER

## 2022-02-03 PROCEDURE — A9270 NON-COVERED ITEM OR SERVICE: HCPCS | Performed by: NURSE PRACTITIONER

## 2022-02-03 PROCEDURE — 700102 HCHG RX REV CODE 250 W/ 637 OVERRIDE(OP): Performed by: SURGERY

## 2022-02-03 PROCEDURE — A9270 NON-COVERED ITEM OR SERVICE: HCPCS | Performed by: SURGERY

## 2022-02-03 PROCEDURE — 99231 SBSQ HOSP IP/OBS SF/LOW 25: CPT | Performed by: SURGERY

## 2022-02-03 PROCEDURE — 700111 HCHG RX REV CODE 636 W/ 250 OVERRIDE (IP): Performed by: SURGERY

## 2022-02-03 PROCEDURE — 770001 HCHG ROOM/CARE - MED/SURG/GYN PRIV*

## 2022-02-03 RX ORDER — IBUPROFEN 800 MG/1
400 TABLET ORAL EVERY 6 HOURS PRN
Status: DISCONTINUED | OUTPATIENT
Start: 2022-02-03 | End: 2022-02-04 | Stop reason: HOSPADM

## 2022-02-03 RX ORDER — FUROSEMIDE 20 MG/1
20 TABLET ORAL ONCE
Status: COMPLETED | OUTPATIENT
Start: 2022-02-03 | End: 2022-02-03

## 2022-02-03 RX ORDER — ACETAMINOPHEN 325 MG/1
650 TABLET ORAL EVERY 6 HOURS PRN
Status: DISCONTINUED | OUTPATIENT
Start: 2022-02-03 | End: 2022-02-04 | Stop reason: HOSPADM

## 2022-02-03 RX ADMIN — SIMVASTATIN 20 MG: 10 TABLET, FILM COATED ORAL at 21:10

## 2022-02-03 RX ADMIN — ENOXAPARIN SODIUM 30 MG: 30 INJECTION SUBCUTANEOUS at 17:32

## 2022-02-03 RX ADMIN — ACETAMINOPHEN 650 MG: 325 TABLET, FILM COATED ORAL at 17:32

## 2022-02-03 RX ADMIN — LIDOCAINE 1 PATCH: 50 PATCH TOPICAL at 06:44

## 2022-02-03 RX ADMIN — TRAMADOL HYDROCHLORIDE 50 MG: 50 TABLET, COATED ORAL at 06:41

## 2022-02-03 RX ADMIN — IBUPROFEN 400 MG: 800 TABLET, FILM COATED ORAL at 17:32

## 2022-02-03 RX ADMIN — FUROSEMIDE 20 MG: 20 TABLET ORAL at 08:22

## 2022-02-03 RX ADMIN — ENOXAPARIN SODIUM 30 MG: 30 INJECTION SUBCUTANEOUS at 06:41

## 2022-02-03 RX ADMIN — DOCUSATE SODIUM 100 MG: 100 CAPSULE, LIQUID FILLED ORAL at 06:41

## 2022-02-03 RX ADMIN — TERAZOSIN HYDROCHLORIDE 5 MG: 5 CAPSULE ORAL at 21:10

## 2022-02-03 RX ADMIN — LISINOPRIL 10 MG: 5 TABLET ORAL at 06:41

## 2022-02-03 ASSESSMENT — ENCOUNTER SYMPTOMS
FOCAL WEAKNESS: 0
CONSTIPATION: 0
SPEECH CHANGE: 0
SHORTNESS OF BREATH: 0
DOUBLE VISION: 0
BACK PAIN: 0
FEVER: 0
ABDOMINAL PAIN: 0
HEADACHES: 0
CHILLS: 0
VOMITING: 0
SENSORY CHANGE: 0
DIZZINESS: 0
BLURRED VISION: 0
MYALGIAS: 1
NAUSEA: 0
NECK PAIN: 0
TINGLING: 0

## 2022-02-03 ASSESSMENT — PAIN DESCRIPTION - PAIN TYPE
TYPE: ACUTE PAIN

## 2022-02-03 NOTE — CARE PLAN
Problem: Hyperinflation  Goal: Prevent or improve atelectasis  Description: Target End Date:  3 to 4 days    1. Instruct incentive spirometry usage  2.  Perform hyperinflation therapy as indicated  Outcome: Progressing. Pt achieves 2500mL+ (Adult IS on backorder, children's IS max out at 2500mL) using the IS with good effort and technique.

## 2022-02-03 NOTE — PROGRESS NOTES
Bedside shift report received from night shift RN. Patient is A+Ox4 and is resting comfortably. Patient states 6/10 pain and denies need for pain medication at this time, heat pack given to the patient. Patient updated on current plan of care and shift goals established. No further needs at this time. Bed locked in lowest position, upper bed rails up, call light and belongings within reach.Bed alarm is on. Hourly rounding in place.

## 2022-02-03 NOTE — PROGRESS NOTES
Trauma / Surgical Daily Progress Note    Date of Service  2/3/2022    Chief Complaint  77 y.o. male admitted 1/26/2022 with multiple left rib fractures and small left pneumothorax after a GLF    POD # 7 Left tube thoracostomy    Interval Events  Reports confused this morning, GCS 15, counseled to POC  CXR with worsening edema  IS 1500  Discussed chest physiotherapy with RT    - Lasix x 1  - Add flutter valve  - Continue aggressive pulmonary hygiene and mobilization efforts  - Remains medically cleared for SNF placement, anticipate available bed Monday, 2/7    Review of Systems  Review of Systems   Constitutional: Negative for chills and fever.   HENT: Negative for hearing loss.    Eyes: Negative for blurred vision and double vision.   Respiratory: Negative for shortness of breath.    Cardiovascular: Negative for chest pain.   Gastrointestinal: Negative for abdominal pain, constipation (Last BM 2/2), nausea and vomiting.   Genitourinary: Negative for dysuria (voiding).   Musculoskeletal: Positive for myalgias (left chest wall). Negative for back pain, joint pain and neck pain.   Skin: Negative for rash.   Neurological: Negative for dizziness, tingling, sensory change, speech change, focal weakness and headaches.        Vital Signs  Temp:  [36.3 °C (97.3 °F)-37.1 °C (98.8 °F)] 36.4 °C (97.5 °F)  Pulse:  [60-68] 67  Resp:  [16-18] 16  BP: (110-131)/(48-55) 115/55  SpO2:  [90 %-95 %] 91 %    Physical Exam  Physical Exam  Vitals and nursing note reviewed.   Constitutional:       General: He is awake. He is not in acute distress.     Appearance: He is well-developed. He is not ill-appearing.   HENT:      Head: Normocephalic and atraumatic.      Right Ear: External ear normal.      Left Ear: External ear normal.      Nose: Nose normal.      Mouth/Throat:      Mouth: Mucous membranes are moist.      Pharynx: Oropharynx is clear.   Eyes:      Pupils: Pupils are equal, round, and reactive to light.   Pulmonary:       Effort: Pulmonary effort is normal. No respiratory distress.      Comments: IS 1500  Chest:      Chest wall: Tenderness (left chest wall) present.   Musculoskeletal:      Cervical back: Neck supple.      Comments: Moves all extremities   Skin:     General: Skin is warm and dry.      Comments: Scattered abrasions   Neurological:      Mental Status: He is alert.      GCS: GCS eye subscore is 4. GCS verbal subscore is 5. GCS motor subscore is 6.   Psychiatric:         Mood and Affect: Mood normal.         Behavior: Behavior normal. Behavior is cooperative.         Laboratory  No results found for this or any previous visit (from the past 24 hour(s)).    Fluids    Intake/Output Summary (Last 24 hours) at 2/3/2022 1319  Last data filed at 2/3/2022 1200  Gross per 24 hour   Intake 700 ml   Output 1550 ml   Net -850 ml       Core Measures & Quality Metrics  Radiology images reviewed, Labs reviewed and Medications reviewed  Maya catheter: No Maya      DVT Prophylaxis: Enoxaparin (Lovenox)  DVT prophylaxis - mechanical: SCDs  Ulcer prophylaxis: Not indicated    Assessed for rehab: Patient was assess for and/or received rehabilitation services during this hospitalization    RAP Score Total: 6    ETOH Screening  CAGE Score: 0  Assessment complete date: 2/1/2022 (Admission BA negative, CAGE negative)        Assessment/Plan  Discharge planning issues- (present on admission)  Assessment & Plan  Date of admission: 1/26/2022.  1/28 Transfer orders from SICU.  1/28 Rehab recommending SNF. Patient has been accepted to a few SNFs. Waiting on bed availability.  2/2 SNF will have a bed available Monday, 2/7.  Cleared for discharge: Yes - Date: 2/1.   Discharge delayed: Yes - Reason: Bed availability.  Discharge date: tbd.    Multiple rib fractures involving four or more ribs- (present on admission)  Assessment & Plan  Fractures of the 3rd, 4th, 5th, and 6th left ribs.  Aggressive pulmonary hygiene and multimodal pain  management.    Frequent falls- (present on admission)  Assessment & Plan  PT/OT.  SNF placement.    Hypertension- (present on admission)  Assessment & Plan  Chronic condition treated with lisinopril, metoprolol.  Resumed maintenance medication on admission.   1/28 DC metoprolol secondary to bradycardia.  Consider resumption of metoprolol as clinically indicated.    BPH (benign prostatic hyperplasia)- (present on admission)  Assessment & Plan  Chronic condition treated with terazosin.  Resumed maintenance medication on admission.     Hyperlipidemia- (present on admission)  Assessment & Plan  Chronic condition treated with simvastatin.  Resumed maintenance medication on admission.    Left hip pain- (present on admission)  Assessment & Plan  Report of left hip fracture on read from [facility] imaging.  No fracture seen on review by Dr Baeza.  Weight bearing status - Weightbearing as tolerated LLE.  Seth Baeza MD. Orthopedic Surgeon. [facility].    No contraindication to deep vein thrombosis (DVT) prophylaxis- (present on admission)  Assessment & Plan  Prophylactic dose enoxaparin initiated upon admission.     Trauma- (present on admission)  Assessment & Plan  Fall from a couch one night prior to presentation.   Found down by a neighbor after 6-7 hrs.  History of frequent falls  Trauma Green Transfer Activation.  Lee Grimaldo MD. Trauma Surgery.      Discussed patient condition with RN, , , Patient and trauma surgery. Dr. Maxwell

## 2022-02-03 NOTE — CARE PLAN
Problem: Skin Integrity  Goal: Skin integrity is maintained or improved  Outcome: Progressing     Problem: Fall Risk  Goal: Patient will remain free from falls  Outcome: Progressing     Problem: Pain - Standard  Goal: Alleviation of pain or a reduction in pain to the patient’s comfort goal  Outcome: Progressing   The patient is Stable - Low risk of patient condition declining or worsening    Shift Goals  Clinical Goals: safety; OOB to chair for meals; pain control & comfort  Patient Goals: rest; pain control  Family Goals: no family present    Progress made toward(s) clinical / shift goals:  yes

## 2022-02-03 NOTE — PROGRESS NOTES
AxOx4. On room air.  Denies SOB/chest pain/numbness or tingling.  PRN pain med in use.  L flank drg from previous chest tube, CDI.  Tolerating diet. Denies N/V.  LBM 2/2, +void.  Ambulates with 2 person assist with use of a FWW.  SCDs on. Lovenox in use.

## 2022-02-03 NOTE — CARE PLAN
The patient is Stable - Low risk of patient condition declining or worsening    Shift Goals  Clinical Goals: Safety, OOB activity, pulmonary hyigene, pain control  Patient Goals: Pain control  Family Goals: No family present    Problem: Knowledge Deficit - Standard  Goal: Patient and family/care givers will demonstrate understanding of plan of care, disease process/condition, diagnostic tests and medications  Outcome: Met     Problem: Fall Risk  Goal: Patient will remain free from falls  Outcome: Met     Problem: Pain - Standard  Goal: Alleviation of pain or a reduction in pain to the patient’s comfort goal  Outcome: Met       Progress made toward(s) clinical / shift goals:      Patient updated on current plan of care and verbalizes understanding.   Pain is controlled with current medications per MAR.   Fall education provided and patient verbalizes understanding. Bed alarm is on and patient calls appropriately.       Patient is not progressing towards the following goals:

## 2022-02-04 ENCOUNTER — APPOINTMENT (OUTPATIENT)
Dept: RADIOLOGY | Facility: MEDICAL CENTER | Age: 77
DRG: 200 | End: 2022-02-04
Attending: NURSE PRACTITIONER
Payer: COMMERCIAL

## 2022-02-04 ENCOUNTER — PATIENT OUTREACH (OUTPATIENT)
Dept: HEALTH INFORMATION MANAGEMENT | Facility: OTHER | Age: 77
End: 2022-02-04

## 2022-02-04 VITALS
WEIGHT: 196.43 LBS | HEART RATE: 69 BPM | HEIGHT: 72 IN | RESPIRATION RATE: 16 BRPM | SYSTOLIC BLOOD PRESSURE: 133 MMHG | OXYGEN SATURATION: 93 % | BODY MASS INDEX: 26.61 KG/M2 | TEMPERATURE: 98.4 F | DIASTOLIC BLOOD PRESSURE: 52 MMHG

## 2022-02-04 PROBLEM — Z78.9 NO CONTRAINDICATION TO DEEP VEIN THROMBOSIS (DVT) PROPHYLAXIS: Status: RESOLVED | Noted: 2022-01-27 | Resolved: 2022-02-04

## 2022-02-04 PROBLEM — Z02.9 DISCHARGE PLANNING ISSUES: Status: RESOLVED | Noted: 2022-01-28 | Resolved: 2022-02-04

## 2022-02-04 PROBLEM — Z75.8 DISCHARGE PLANNING ISSUES: Status: RESOLVED | Noted: 2022-01-28 | Resolved: 2022-02-04

## 2022-02-04 PROBLEM — T14.90XA TRAUMA: Status: RESOLVED | Noted: 2022-01-27 | Resolved: 2022-02-04

## 2022-02-04 PROBLEM — M25.552 LEFT HIP PAIN: Status: RESOLVED | Noted: 2022-01-27 | Resolved: 2022-02-04

## 2022-02-04 LAB
ANION GAP SERPL CALC-SCNC: 8 MMOL/L (ref 7–16)
BASOPHILS # BLD AUTO: 0.9 % (ref 0–1.8)
BASOPHILS # BLD: 0.05 K/UL (ref 0–0.12)
BUN SERPL-MCNC: 16 MG/DL (ref 8–22)
CALCIUM SERPL-MCNC: 8.7 MG/DL (ref 8.5–10.5)
CHLORIDE SERPL-SCNC: 103 MMOL/L (ref 96–112)
CO2 SERPL-SCNC: 25 MMOL/L (ref 20–33)
CREAT SERPL-MCNC: 0.95 MG/DL (ref 0.5–1.4)
EOSINOPHIL # BLD AUTO: 0.21 K/UL (ref 0–0.51)
EOSINOPHIL NFR BLD: 3.9 % (ref 0–6.9)
ERYTHROCYTE [DISTWIDTH] IN BLOOD BY AUTOMATED COUNT: 50.6 FL (ref 35.9–50)
EXTERNAL QUALITY CONTROL: NORMAL
GLUCOSE SERPL-MCNC: 93 MG/DL (ref 65–99)
HCT VFR BLD AUTO: 34.5 % (ref 42–52)
HGB BLD-MCNC: 11 G/DL (ref 14–18)
IMM GRANULOCYTES # BLD AUTO: 0.1 K/UL (ref 0–0.11)
IMM GRANULOCYTES NFR BLD AUTO: 1.9 % (ref 0–0.9)
LYMPHOCYTES # BLD AUTO: 0.84 K/UL (ref 1–4.8)
LYMPHOCYTES NFR BLD: 15.6 % (ref 22–41)
MCH RBC QN AUTO: 31.8 PG (ref 27–33)
MCHC RBC AUTO-ENTMCNC: 31.9 G/DL (ref 33.7–35.3)
MCV RBC AUTO: 99.7 FL (ref 81.4–97.8)
MONOCYTES # BLD AUTO: 0.75 K/UL (ref 0–0.85)
MONOCYTES NFR BLD AUTO: 13.9 % (ref 0–13.4)
NEUTROPHILS # BLD AUTO: 3.45 K/UL (ref 1.82–7.42)
NEUTROPHILS NFR BLD: 63.8 % (ref 44–72)
NRBC # BLD AUTO: 0 K/UL
NRBC BLD-RTO: 0 /100 WBC
PLATELET # BLD AUTO: 237 K/UL (ref 164–446)
PMV BLD AUTO: 9.5 FL (ref 9–12.9)
POTASSIUM SERPL-SCNC: 3.8 MMOL/L (ref 3.6–5.5)
RBC # BLD AUTO: 3.46 M/UL (ref 4.7–6.1)
SARS-COV+SARS-COV-2 AG RESP QL IA.RAPID: NORMAL
SARS-COV-2 RNA RESP QL NAA+PROBE: NOTDETECTED
SODIUM SERPL-SCNC: 136 MMOL/L (ref 135–145)
SPECIMEN SOURCE: NORMAL
WBC # BLD AUTO: 5.4 K/UL (ref 4.8–10.8)

## 2022-02-04 PROCEDURE — C9803 HOPD COVID-19 SPEC COLLECT: HCPCS | Performed by: NURSE PRACTITIONER

## 2022-02-04 PROCEDURE — 700111 HCHG RX REV CODE 636 W/ 250 OVERRIDE (IP): Performed by: SURGERY

## 2022-02-04 PROCEDURE — 99239 HOSP IP/OBS DSCHRG MGMT >30: CPT | Performed by: NURSE PRACTITIONER

## 2022-02-04 PROCEDURE — A9270 NON-COVERED ITEM OR SERVICE: HCPCS | Performed by: NURSE PRACTITIONER

## 2022-02-04 PROCEDURE — 71045 X-RAY EXAM CHEST 1 VIEW: CPT

## 2022-02-04 PROCEDURE — 700102 HCHG RX REV CODE 250 W/ 637 OVERRIDE(OP): Performed by: NURSE PRACTITIONER

## 2022-02-04 PROCEDURE — A9270 NON-COVERED ITEM OR SERVICE: HCPCS | Performed by: SURGERY

## 2022-02-04 PROCEDURE — U0005 INFEC AGEN DETEC AMPLI PROBE: HCPCS

## 2022-02-04 PROCEDURE — U0003 INFECTIOUS AGENT DETECTION BY NUCLEIC ACID (DNA OR RNA); SEVERE ACUTE RESPIRATORY SYNDROME CORONAVIRUS 2 (SARS-COV-2) (CORONAVIRUS DISEASE [COVID-19]), AMPLIFIED PROBE TECHNIQUE, MAKING USE OF HIGH THROUGHPUT TECHNOLOGIES AS DESCRIBED BY CMS-2020-01-R: HCPCS

## 2022-02-04 PROCEDURE — 94669 MECHANICAL CHEST WALL OSCILL: CPT

## 2022-02-04 PROCEDURE — 36415 COLL VENOUS BLD VENIPUNCTURE: CPT

## 2022-02-04 PROCEDURE — 94760 N-INVAS EAR/PLS OXIMETRY 1: CPT

## 2022-02-04 PROCEDURE — 80048 BASIC METABOLIC PNL TOTAL CA: CPT

## 2022-02-04 PROCEDURE — 85025 COMPLETE CBC W/AUTO DIFF WBC: CPT

## 2022-02-04 PROCEDURE — 700102 HCHG RX REV CODE 250 W/ 637 OVERRIDE(OP): Performed by: SURGERY

## 2022-02-04 RX ORDER — ACETAMINOPHEN 325 MG/1
650 TABLET ORAL EVERY 6 HOURS PRN
Qty: 30 TABLET | Refills: 0 | Status: SHIPPED
Start: 2022-02-04

## 2022-02-04 RX ORDER — IBUPROFEN 400 MG/1
400 TABLET ORAL EVERY 6 HOURS PRN
Qty: 30 TABLET | Status: SHIPPED
Start: 2022-02-04

## 2022-02-04 RX ADMIN — DOCUSATE SODIUM 100 MG: 100 CAPSULE, LIQUID FILLED ORAL at 05:36

## 2022-02-04 RX ADMIN — LISINOPRIL 10 MG: 5 TABLET ORAL at 05:36

## 2022-02-04 RX ADMIN — ENOXAPARIN SODIUM 30 MG: 30 INJECTION SUBCUTANEOUS at 05:36

## 2022-02-04 ASSESSMENT — PAIN DESCRIPTION - PAIN TYPE
TYPE: ACUTE PAIN

## 2022-02-04 NOTE — PROGRESS NOTES
Transferring patient to [place] per physician order. Belongings with patient at time of transfer.  Attempted to call report at [phone], [phone], [phone] and [phone].  Pt informed of transfer plan. Patient to update family/friends regarding transfer. POLST placed into [employer] packet. [employer] form completed and chart copy and copy of cobra sent with patient.

## 2022-02-04 NOTE — CARE PLAN
Problem: Skin Integrity  Goal: Skin integrity is maintained or improved  Outcome: Progressing     Problem: Communication  Goal: The ability to communicate needs accurately and effectively will improve  Outcome: Progressing     Problem: Discharge Barriers/Planning  Goal: Patient's continuum of care needs are met  Outcome: Progressing     The patient is Stable - Low risk of patient condition declining or worsening    Shift Goals  Clinical Goals: free from falls, DC  Patient Goals: rest, pain control  Family Goals: No family present    Progress made toward(s) clinical / shift goals:  patient remains free from falls. Patient to discharge to [facility] this evening. Patient able to rest between periods of activity. Pain per flowsheets    Patient is not progressing towards the following goals:

## 2022-02-04 NOTE — CARE PLAN
Shift Goals  Clinical Goals: remain free from falls; pain control  Patient Goals: pain control; sleep  Family Goals: No family present    Progress made toward(s) clinical / shift goals:        Patient skin integrity progressing, turns self. Patient communicates needs as needed; Patient voids and drinks water, tolerating PO intake     Problem: Skin Integrity  Goal: Skin integrity is maintained or improved  Outcome: Progressing     Problem: Communication  Goal: The ability to communicate needs accurately and effectively will improve  Outcome: Progressing     Problem: Fluid Volume  Goal: Fluid volume balance will be maintained  Outcome: Progressing     Problem: Urinary Elimination  Goal: Establish and maintain regular urinary output  Outcome: Progressing

## 2022-02-04 NOTE — PROGRESS NOTES
Attempted to call report to [facility]. Was put on hold and call dropped. Attempted to redial, unable to speak with any staff. Will repeat attempt to call report

## 2022-02-04 NOTE — DISCHARGE SUMMARY
Trauma Discharge Summary    DATE OF ADMISSION: 1/26/2022    DATE OF DISCHARGE: 2/4/2022    LENGTH OF STAY: 9 days    ATTENDING PHYSICIAN: Lee Maxwell M.D.    CONSULTING PHYSICIAN:   1. Dr. Joshua Sandhu, physiatry    DISCHARGE DIAGNOSIS:  Active Problems:    Multiple rib fractures involving four or more ribs POA: Yes    Hypertension POA: Yes    Frequent falls POA: Yes    Hyperlipidemia POA: Yes    BPH (benign prostatic hyperplasia) POA: Yes  Resolved Problems:    Discharge planning issues POA: Yes    Trauma POA: Yes    Traumatic pneumothorax POA: Yes    Encounter for screening for COVID-19 POA: Yes    No contraindication to deep vein thrombosis (DVT) prophylaxis POA: Yes    Left hip pain POA: Yes      PROCEDURES:  1. Left tube thoracostomy on 1/27/2022 by Dr. Lee Maxwell.    HISTORY OF PRESENT ILLNESS: The patient is a 77 y.o. male who was reportedly found down by a neighbor. He was initially evaluated at the [facility] where imaging demonstrated multiple rib fractures, a pneumothorax, and a hip fracture. He was transferred to [facility] in [place], [place].    HOSPITAL COURSE: The patient was triaged as a consult activation. The patient was transported to the trauma intensive care unit.  He is a DNR/DNI.  He had multiple rib fractures and a small pneumothorax. A chest tube was placed.  The patient was provided aggressive pulmonary hygiene and a multimodal pain regimen.  He was followed with serial chest radiography.  Imaging from the referring facility read stated there was a left hip fracture.  This was reviewed by Dr. Seth Baeza with orthopedic surgery and no fracture was visualized.  There are no weightbearing restrictions.  Patient does have multiple comorbidities including hypertension, hyperlipidemia, and BPH.  Medications were reviewed and appropriate home medications were resumed.  The patient's metoprolol was discontinued secondary to bradycardia and this will not be continued  at this time.  The patient was made sears status on 1/28/2022.  He worked with physical and occupational therapies for his history of frequent falls and will benefit from postacute service placement.  His left chest tube was able to be removed.  He was continued on aggressive pulmonary hygiene and a multimodal pain regimen.  He is currently tolerating room air and a regular diet.  He is ambulating with assistance.  He has good incentive spirometry effort; however, serial chest x-ray demonstrates stable pulmonary edema and or infiltrates and a trace left pleural effusion.  He is reporting adequate pain control with the current regimen.    HOSPITAL PROBLEM LIST:  Multiple rib fractures involving four or more ribs- (present on admission)  Assessment & Plan  Fractures of the 3rd, 4th, 5th, and 6th left ribs.  Aggressive pulmonary hygiene and multimodal pain management.    Discharge planning issues-resolved as of 2/4/2022, (present on admission)  Assessment & Plan  Date of admission: 1/26/2022.  1/28 Transfer orders from SICU.  1/28 Rehab recommending SNF. Patient has been accepted to a few SNFs. Waiting on bed availability.  2/2 SNF will have a bed available Monday, 2/7.  2/3 Potential bed available tomorrow, Covid test ordered.  Cleared for discharge: Yes - Date: 2/1.   Discharge delayed: Yes - Reason: Bed availability.  Discharge date: 2/4    Frequent falls- (present on admission)  Assessment & Plan  PT/OT.  SNF placement.    Hypertension- (present on admission)  Assessment & Plan  Chronic condition treated with lisinopril, metoprolol.  Resumed maintenance medication on admission.   1/28 DC metoprolol secondary to bradycardia.    BPH (benign prostatic hyperplasia)- (present on admission)  Assessment & Plan  Chronic condition treated with terazosin.  Resumed maintenance medication on admission.     Hyperlipidemia- (present on admission)  Assessment & Plan  Chronic condition treated with simvastatin.  Resumed maintenance  medication on admission.    Left hip pain-resolved as of 2/4/2022, (present on admission)  Assessment & Plan  Report of left hip fracture on read from [facility] imaging.  No fracture seen on review by Dr Baeza.  Weight bearing status - Weightbearing as tolerated LLE.  Seth Baeza MD. Orthopedic Surgeon. [facility].    No contraindication to deep vein thrombosis (DVT) prophylaxis-resolved as of 2/4/2022, (present on admission)  Assessment & Plan  Prophylactic dose enoxaparin initiated upon admission.     Encounter for screening for COVID-19-resolved as of 1/28/2022, (present on admission)  Assessment & Plan  Admission SARS-CoV-2 testing negative. Repeat SARS-CoV-2 testing not indicated. Isolation precautions de-escalated.    Traumatic pneumothorax-resolved as of 2/2/2022, (present on admission)  Assessment & Plan  Small left pneumothorax.  Left chest tube placed in ICU.  1/31 Chest tube removal, 2 sutures remain.  CXR s/p tube removal negative for pneumothorax.  Aggressive pulmonary hygiene and serial chest radiography.    Trauma-resolved as of 2/4/2022, (present on admission)  Assessment & Plan  Fall from a couch one night prior to presentation.   Found down by a neighbor after 6-7 hrs.  History of frequent falls  Trauma Green Transfer Activation.  Lee Grimaldo MD. Trauma Surgery.      DISPOSITION: Discharged to [facility] on 2/4/2022. The patient was counseled and questions were answered. Specifically, signs and symptoms of infection, respiratory decompensation, and persistent or worsening pain were discussed and the patient agrees to seek medical attention if any of these develop.    DISCHARGE MEDICATIONS:  The patients controlled substance history was reviewed and a controlled substance use informed consent (if applicable) was provided by [facility] [place] [facility] and the patient has been prescribed.     Medication List      START taking these medications      Instructions    acetaminophen 325 MG Tabs  Commonly known as: Tylenol   Take 2 Tablets by mouth every 6 hours as needed.  Dose: 650 mg     ibuprofen 400 MG Tabs  Commonly known as: MOTRIN   Take 1 Tablet by mouth every 6 hours as needed.  Dose: 400 mg        CONTINUE taking these medications      Instructions   aspirin EC 81 MG Tbec  Commonly known as: ECOTRIN   Take 81 mg by mouth every day.  Dose: 81 mg     lisinopril 10 MG Tabs  Commonly known as: PRINIVIL   Take 10 mg by mouth every day.  Dose: 10 mg     simvastatin 20 MG Tabs  Commonly known as: ZOCOR   Take 20 mg by mouth every evening.  Dose: 20 mg     terazosin 5 MG Caps  Commonly known as: HYTRIN   Take 5 mg by mouth every evening.  Dose: 5 mg        STOP taking these medications    metoprolol tartrate 25 MG Tabs  Commonly known as: LOPRESSOR            ACTIVITY:  Activity as tolerated. No weightbearing restrictions.    WOUND CARE:  No wound submersion until previous chest tube site well healed (~ 2 weeks), may shower.    DIET:  Orders Placed This Encounter   Procedures   • Diet Order Diet: Regular     Standing Status:   Standing     Number of Occurrences:   1     Order Specific Question:   Diet:     Answer:   Regular [1]       FOLLOW UP:  Lee Maxwell M.D.  [address]  [address]  [facility] [id number] [phone]      As needed    FLOR Lazaro  [address] [facility] [address]  [phone]    Schedule an appointment as soon as possible for a visit        TIME SPENT ON DISCHARGE: 35 minutes      ____________________________________________  FLOR Whitt    DD: 2/4/2022 8:43 AM

## 2022-02-04 NOTE — PROGRESS NOTES
Attempted to call report to [facility]. Spoke with  staff. Was put on hold and call dropped. Attempted to redial, unable to speak with any staff. Will repeat attempt to call report

## 2022-02-04 NOTE — CARE PLAN
Problem: Hyperinflation  Goal: Prevent or improve atelectasis  Description: Target End Date:  3 to 4 days    1. Instruct incentive spirometry usage  2.  Perform hyperinflation therapy as indicated  Outcome: Progressing  Flowsheets (Taken 2/1/2022 0824 by Yeni Patton, DINAH)  Hyperinflation Protocol Goals/Outcome: Stable Vital Capacity x24 hrs and Patient Understands / uses I.S.  Hyperinflation Protocol Indications: Chest Trauma (Blunt, Penetrative, Crushing, or Surgical)

## 2022-02-04 NOTE — PROGRESS NOTES
Assumed care of patient at 0645. Bedside report received. Assessment complete.  AA&Ox4. Denies CP/SOB.  Reporting 0/10 pain.   Skin per flow sheets.  Tolerating diet. Denies N/V.  + void. Last BM 2/2  Pt x2 transfer to commode.   High fall risk per Mcdonald Foster Score. Bed alarm on and audible for safety.  Plan of care discussed, all questions answered. Educated on the importance of calling before getting OOB and pt verbalizes understanding. Educated regarding importance of oral care. Oral care kit at bedside. Call light is within reach, treaded slipper socks on, bed in lowest/ locked position, hourly rounding in place, all needs met at this time.

## 2022-02-04 NOTE — DISCHARGE PLANNING
DC Transport Scheduled    Received request at: 0809    Transport Company Scheduled:  [employer]      Scheduled Date: 02/04/22  Scheduled Time: 1400    Destination: [facility]    [address] [place] [address]    Notified care team of scheduled transport via Voalte.     If there are any changes needed to the DC transportation scheduled, please contact [facility] at ext. [phone] between the hours of 1293-7668 Mon-Fri. If outside those hours, contact the ED Case Manager at ext. [phone].

## 2022-02-04 NOTE — DISCHARGE PLANNING
Anticipated Discharge Disposition: SNF    Action: Per DPA, [facility] is able to accept this patient today. Per RN, patient is able to transfer via wheelchair van and is on RA. LSW faxed the Transport, Facesheet, and Approved Form to [facility].    [facility] scheduled the transfer for today at 1400. LSW requested the transfer is rescheduled for today at 1700 due to delay in the Covid Result.    LSW completed the Transfer Packet and the [employer] Form. LSW met with patient at bedside and explained the transfer arrangements, patient agreed and signed the [employer] Form, patient stated he will notify his friends and family, RN aware.    RN provided the Negative Covid Result which was upload it in the  Tab by DPA, per LSW request.     Barriers to Discharge: None.    Plan: SNF.

## 2022-02-04 NOTE — DISCHARGE INSTRUCTIONS
- Call or seek medical attention for questions or concerns  - Follow up with Dr. Maxwell as needed  - Follow up with primary care provider within one weeks time  - Resume regular diet  - May take over the counter acetaminophen or ibuprofen as needed for pain  - No swimming, hot tubs, baths or wound submersion until cleared by outpatient provider. May shower  - No contact sports, strenuous activities, or heavy lifting until cleared by outpatient provider  - If respiratory decompensation, persistent or worsening pain, or signs or symptoms of infection occur seek medical attention    Discharge Instructions    Discharged to other by medical transportation with escort. Discharged via wheelchair, hospital escort: Yes.  Special equipment needed: Not Applicable    Be sure to schedule a follow-up appointment with your primary care doctor or any specialists as instructed.     Discharge Plan:   Influenza Vaccine Indication: Not indicated: Previously immunized this influenza season and > 8 years of age    I understand that a diet low in cholesterol, fat, and sodium is recommended for good health. Unless I have been given specific instructions below for another diet, I accept this instruction as my diet prescription.   Other diet: regular    Special Instructions: None    · Is patient discharged on Warfarin / Coumadin?   No     Depression / Suicide Risk    As you are discharged from this [facility] facility, it is important to learn how to keep safe from harming yourself.    Recognize the warning signs:  · Abrupt changes in personality, positive or negative- including increase in energy   · Giving away possessions  · Change in eating patterns- significant weight changes-  positive or negative  · Change in sleeping patterns- unable to sleep or sleeping all the time   · Unwillingness or inability to communicate  · Depression  · Unusual sadness, discouragement and loneliness  · Talk of wanting to die  · Neglect of personal  appearance   · Rebelliousness- reckless behavior  · Withdrawal from people/activities they love  · Confusion- inability to concentrate     If you or a loved one observes any of these behaviors or has concerns about self-harm, here's what you can do:  · Talk about it- your feelings and reasons for harming yourself  · Remove any means that you might use to hurt yourself (examples: pills, rope, extension cords, firearm)  · Get professional help from the community (Mental Health, Substance Abuse, psychological counseling)  · Do not be alone:Call your Safe Contact- someone whom you trust who will be there for you.  · Call your local CRISIS HOTLINE 112-1047 or 350-911-5910  · Call your local Children's Mobile Crisis Response Team [place] (416) 180-4114 or www.[employer]  · Call the toll free National Suicide Prevention Hotlines   · National Suicide Prevention Lifeline 745-022-GCJX (7511)  · [employer] 800-SUICIDE (496-6149)    Rib Fracture    A rib fracture is a break or crack in one of the bones of the ribs. The ribs are long, curved bones that wrap around your chest and attach to your spine and your breastbone. The ribs protect your heart, lungs, and other organs in the chest.  A broken or cracked rib is often painful but is not usually serious. Most rib fractures heal on their own over time. However, rib fractures can be more serious if multiple ribs are broken or if broken ribs move out of place and push against other structures or organs.  What are the causes?  This condition is caused by:  · Repetitive movements with high force, such as pitching a baseball or having severe coughing spells.  · A direct blow to the chest, such as a sports injury, a car accident, or a fall.  · Cancer that has spread to the bones, which can weaken bones and cause them to break.  What are the signs or symptoms?  Symptoms of this condition include:  · Pain when you breathe in or cough.  · Pain when someone  presses on the injured area.  · Feeling short of breath.  How is this diagnosed?  This condition is diagnosed with a physical exam and medical history. Imaging tests may also be done, such as:  · Chest X-ray.  · CT scan.  · MRI.  · Bone scan.  · Chest ultrasound.  How is this treated?  Treatment for this condition depends on the severity of the fracture. Most rib fractures usually heal on their own in 1-3 months. Sometimes healing takes longer if there is a cough that does not stop or if there are other activities that make the injury worse (aggravating factors). While you heal, you will be given medicines to control the pain. You will also be taught deep breathing exercises.  Severe injuries may require hospitalization or surgery.  Follow these instructions at home:  Managing pain, stiffness, and swelling  · If directed, apply ice to the injured area.  ? Put ice in a plastic bag.  ? Place a towel between your skin and the bag.  ? Leave the ice on for 20 minutes, 2-3 times a day.  · Take over-the-counter and prescription medicines only as told by your health care provider.  Activity  · Avoid a lot of activity and any activities or movements that cause pain. Be careful during activities and avoid bumping the injured rib.  · Slowly increase your activity as told by your health care provider.  General instructions  · Do deep breathing exercises as told by your health care provider. This helps prevent pneumonia, which is a common complication of a broken rib. Your health care provider may instruct you to:  ? Take deep breaths several times a day.  ? Try to cough several times a day, holding a pillow against the injured area.  ? Use a device called incentive spirometer to practice deep breathing several times a day.  · Drink enough fluid to keep your urine pale yellow.  · Do not wear a rib belt or binder. These restrict breathing, which can lead to pneumonia.  · Keep all follow-up visits as told by your health care  provider. This is important.  Contact a health care provider if:  · You have a fever.  Get help right away if:  · You have difficulty breathing or you are short of breath.  · You develop a cough that does not stop, or you cough up thick or bloody sputum.  · You have nausea, vomiting, or pain in your abdomen.  · Your pain gets worse and medicine does not help.  Summary  · A rib fracture is a break or crack in one of the bones of the ribs.  · A broken or cracked rib is often painful but is not usually serious.  · Most rib fractures heal on their own over time.  · Treatment for this condition depends on the severity of the fracture.  · Avoid a lot of activity and any activities or movements that cause pain.  This information is not intended to replace advice given to you by your health care provider. Make sure you discuss any questions you have with your health care provider.  Document Released: 12/18/2006 Document Revised: 11/30/2018 Document Reviewed: 03/19/2018  Elsevier Patient Education © 2020 Elsevier Inc.

## 2022-02-05 NOTE — PROGRESS NOTES
Spoke with  staff at [facility]. Per  RN busy and unable to take report. Call back number left, [place] RN to call for report.

## 2022-02-15 NOTE — DOCUMENTATION QUERY
On license of [facility]                                                                       Query Response Note      PATIENT:               EVELIA WILSON  ACCT #:                  [id number]  MRN:                     [id number]  :                      1945  ADMIT DATE:       2022 11:26 PM  DISCH DATE:        2022 6:35 PM  RESPONDING  PROVIDER #:        [id number]           QUERY TEXT:    There is conflicting documentation in the medical record.      No documentation of Traumatic Rhabdomyolysis is documented in the H&P, Progress Notes or Discharge Summary.      Traumatic Rhabdomyolysis is documented in the ED Provider Note.    Based on treatment, clinical findings and risk factors, can this documentation be further clarified?    The patient's Clinical Indicators include:  Patient admitted s/p ground level fall, was on the ground for approximately 10 hours.  Dx this admission included multiple left rib fractures with pneumothorax.  Labs: CPK 1008, 573  ED Provider Note Final Impression included Traumatic rhabdomyolysis.  TX: 250cc NS bolus, LR @ 75/hr, lab monitoring    Thank you,  Masha Diaz RN, CCDS  Clinical   Connect via [employer] Messenger  Options provided:   -- Agree, Traumatic Rhabdomyolysis present on admission   -- Disagree, elevated CPK level clinically insignificant   -- Unable to determine      Query created by: Masha iDaz on 2022 12:00 PM    RESPONSE TEXT:    Disagree, elevated CPK level clinically insignificant          Electronically signed by:  DAWOOD ARAGON MD 2022 7:10 PM

## 2022-07-19 ENCOUNTER — HOSPITAL ENCOUNTER (EMERGENCY)
Facility: MEDICAL CENTER | Age: 77
End: 2022-07-19
Attending: EMERGENCY MEDICINE
Payer: COMMERCIAL

## 2022-07-19 ENCOUNTER — APPOINTMENT (OUTPATIENT)
Dept: RADIOLOGY | Facility: MEDICAL CENTER | Age: 77
End: 2022-07-19
Attending: EMERGENCY MEDICINE
Payer: COMMERCIAL

## 2022-07-19 VITALS
OXYGEN SATURATION: 93 % | DIASTOLIC BLOOD PRESSURE: 77 MMHG | HEIGHT: 72 IN | SYSTOLIC BLOOD PRESSURE: 128 MMHG | BODY MASS INDEX: 25.73 KG/M2 | RESPIRATION RATE: 16 BRPM | TEMPERATURE: 98.1 F | HEART RATE: 74 BPM | WEIGHT: 190 LBS

## 2022-07-19 DIAGNOSIS — S01.81XA FACIAL LACERATION, INITIAL ENCOUNTER: ICD-10-CM
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
## 2022-07-19 DIAGNOSIS — S02.40CA CLOSED FRACTURE OF RIGHT SIDE OF MAXILLA, INITIAL ENCOUNTER (HCC): ICD-10-CM

## 2022-07-19 LAB
ALBUMIN SERPL BCP-MCNC: 3.6 G/DL (ref 3.2–4.9)
ALBUMIN/GLOB SERPL: 1.3 G/DL
ALP SERPL-CCNC: 72 U/L (ref 30–99)
ALT SERPL-CCNC: 15 U/L (ref 2–50)
ANION GAP SERPL CALC-SCNC: 11 MMOL/L (ref 7–16)
AST SERPL-CCNC: 23 U/L (ref 12–45)
BASOPHILS # BLD AUTO: 0.6 % (ref 0–1.8)
BASOPHILS # BLD: 0.04 K/UL (ref 0–0.12)
BILIRUB SERPL-MCNC: 0.4 MG/DL (ref 0.1–1.5)
BUN SERPL-MCNC: 24 MG/DL (ref 8–22)
CALCIUM SERPL-MCNC: 8.9 MG/DL (ref 8.5–10.5)
CHLORIDE SERPL-SCNC: 103 MMOL/L (ref 96–112)
CO2 SERPL-SCNC: 22 MMOL/L (ref 20–33)
CREAT SERPL-MCNC: 1.76 MG/DL (ref 0.5–1.4)
EOSINOPHIL # BLD AUTO: 0.01 K/UL (ref 0–0.51)
EOSINOPHIL NFR BLD: 0.1 % (ref 0–6.9)
ERYTHROCYTE [DISTWIDTH] IN BLOOD BY AUTOMATED COUNT: 45.4 FL (ref 35.9–50)
GFR SERPLBLD CREATININE-BSD FMLA CKD-EPI: 39 ML/MIN/1.73 M 2
GLOBULIN SER CALC-MCNC: 2.7 G/DL (ref 1.9–3.5)
GLUCOSE SERPL-MCNC: 154 MG/DL (ref 65–99)
HCT VFR BLD AUTO: 42.7 % (ref 42–52)
HGB BLD-MCNC: 14.7 G/DL (ref 14–18)
IMM GRANULOCYTES # BLD AUTO: 0.05 K/UL (ref 0–0.11)
IMM GRANULOCYTES NFR BLD AUTO: 0.7 % (ref 0–0.9)
LYMPHOCYTES # BLD AUTO: 1.23 K/UL (ref 1–4.8)
LYMPHOCYTES NFR BLD: 17.8 % (ref 22–41)
MCH RBC QN AUTO: 31.5 PG (ref 27–33)
MCHC RBC AUTO-ENTMCNC: 34.4 G/DL (ref 33.7–35.3)
MCV RBC AUTO: 91.6 FL (ref 81.4–97.8)
MONOCYTES # BLD AUTO: 0.8 K/UL (ref 0–0.85)
MONOCYTES NFR BLD AUTO: 11.6 % (ref 0–13.4)
NEUTROPHILS # BLD AUTO: 4.77 K/UL (ref 1.82–7.42)
NEUTROPHILS NFR BLD: 69.2 % (ref 44–72)
NRBC # BLD AUTO: 0 K/UL
NRBC BLD-RTO: 0 /100 WBC
PLATELET # BLD AUTO: 200 K/UL (ref 164–446)
PMV BLD AUTO: 10.1 FL (ref 9–12.9)
POTASSIUM SERPL-SCNC: 4 MMOL/L (ref 3.6–5.5)
PROT SERPL-MCNC: 6.3 G/DL (ref 6–8.2)
RBC # BLD AUTO: 4.66 M/UL (ref 4.7–6.1)
SODIUM SERPL-SCNC: 136 MMOL/L (ref 135–145)
T4 FREE SERPL-MCNC: 0.93 NG/DL (ref 0.93–1.7)
TROPONIN T SERPL-MCNC: 23 NG/L (ref 6–19)
TSH SERPL DL<=0.005 MIU/L-ACNC: 1.32 UIU/ML (ref 0.38–5.33)
WBC # BLD AUTO: 6.9 K/UL (ref 4.8–10.8)

## 2022-07-19 PROCEDURE — 85025 COMPLETE CBC W/AUTO DIFF WBC: CPT

## 2022-07-19 PROCEDURE — 71045 X-RAY EXAM CHEST 1 VIEW: CPT

## 2022-07-19 PROCEDURE — 303747 HCHG EXTRA SUTURE

## 2022-07-19 PROCEDURE — 700105 HCHG RX REV CODE 258: Performed by: EMERGENCY MEDICINE

## 2022-07-19 PROCEDURE — 70450 CT HEAD/BRAIN W/O DYE: CPT | Mod: MG

## 2022-07-19 PROCEDURE — 80053 COMPREHEN METABOLIC PANEL: CPT

## 2022-07-19 PROCEDURE — 84443 ASSAY THYROID STIM HORMONE: CPT

## 2022-07-19 PROCEDURE — 99285 EMERGENCY DEPT VISIT HI MDM: CPT

## 2022-07-19 PROCEDURE — 84439 ASSAY OF FREE THYROXINE: CPT

## 2022-07-19 PROCEDURE — 93005 ELECTROCARDIOGRAM TRACING: CPT | Performed by: EMERGENCY MEDICINE

## 2022-07-19 PROCEDURE — 304217 HCHG IRRIGATION SYSTEM

## 2022-07-19 PROCEDURE — 94760 N-INVAS EAR/PLS OXIMETRY 1: CPT

## 2022-07-19 PROCEDURE — 304999 HCHG REPAIR-SIMPLE/INTERMED LEVEL 1

## 2022-07-19 PROCEDURE — 700101 HCHG RX REV CODE 250: Performed by: EMERGENCY MEDICINE

## 2022-07-19 PROCEDURE — A9270 NON-COVERED ITEM OR SERVICE: HCPCS | Performed by: EMERGENCY MEDICINE

## 2022-07-19 PROCEDURE — 700102 HCHG RX REV CODE 250 W/ 637 OVERRIDE(OP): Performed by: EMERGENCY MEDICINE

## 2022-07-19 PROCEDURE — 84484 ASSAY OF TROPONIN QUANT: CPT

## 2022-07-19 PROCEDURE — 36415 COLL VENOUS BLD VENIPUNCTURE: CPT

## 2022-07-19 RX ORDER — AMOXICILLIN AND CLAVULANATE POTASSIUM 875; 125 MG/1; MG/1
1 TABLET, FILM COATED ORAL 2 TIMES DAILY
Qty: 20 TABLET | Refills: 0 | Status: SHIPPED | OUTPATIENT
Start: 2022-07-19

## 2022-07-19 RX ORDER — LIDOCAINE HYDROCHLORIDE AND EPINEPHRINE 10; 10 MG/ML; UG/ML
10 INJECTION, SOLUTION INFILTRATION; PERINEURAL ONCE
Status: COMPLETED | OUTPATIENT
Start: 2022-07-19 | End: 2022-07-19

## 2022-07-19 RX ORDER — AMOXICILLIN AND CLAVULANATE POTASSIUM 875; 125 MG/1; MG/1
1 TABLET, FILM COATED ORAL ONCE
Status: COMPLETED | OUTPATIENT
Start: 2022-07-19 | End: 2022-07-19

## 2022-07-19 RX ORDER — SODIUM CHLORIDE 9 MG/ML
1000 INJECTION, SOLUTION INTRAVENOUS ONCE
Status: COMPLETED | OUTPATIENT
Start: 2022-07-19 | End: 2022-07-19

## 2022-07-19 RX ADMIN — SODIUM CHLORIDE 1000 ML: 9 INJECTION, SOLUTION INTRAVENOUS at 10:37

## 2022-07-19 RX ADMIN — AMOXICILLIN AND CLAVULANATE POTASSIUM 1 TABLET: 875; 125 TABLET, FILM COATED ORAL at 16:08

## 2022-07-19 RX ADMIN — LIDOCAINE HYDROCHLORIDE,EPINEPHRINE BITARTRATE 10 ML: 10; .01 INJECTION, SOLUTION INFILTRATION; PERINEURAL at 12:20

## 2022-07-19 ASSESSMENT — FIBROSIS 4 INDEX: FIB4 SCORE: 2.35

## 2022-07-19 ASSESSMENT — PAIN DESCRIPTION - PAIN TYPE: TYPE: ACUTE PAIN

## 2022-07-19 NOTE — ED NOTES
Unable to ambulate patient to the bathroom x2 assist. Pt reports increasing difficulty walking. Placed on the bedpan for BM. ERP aware.

## 2022-07-19 NOTE — DISCHARGE PLANNING
Pt medically cleared and needs to return to [place] where he resides.   SW spoke to [facility] and they are unable to provide transportation as their van is in the shop.  [employer] contacted.  Approved Service for Trip # [id number] for $99.41  Transportation time arranged for [id number].    RN has been updated.

## 2022-07-19 NOTE — ED NOTES
Left Forehead wound irrigated with 500ml sterile water then wrapped with Xeroform and sterile gauze dressing after MD stitched the wound.

## 2022-07-19 NOTE — ED PROVIDER NOTES
ED Provider Note    Scribed for Carlos Manuel Garcia M.D. by Brijesh Daugherty. 2022  10:17 AM    Primary care provider: FLOR Lazaro  Means of arrival: EMS  History obtained from: Patient  History limited by: None    CHIEF COMPLAINT  Chief Complaint   Patient presents with   • T-5000 GLF       HPI  Josse Valdez is a 77 y.o. male who presents to the Emergency Department via EMS for evaluation of an acute traumatic brain injury secondary to a fall onset prior to arrival. Per EMS, patient  was walking with his walker when he fell face forward, causing injury to the left side of his head. Positive loss of consciousness for about one minute. EMS was then called. Patient has associated laceration to the left forehead. Denies any neck pain. Per living facility, patient has a recent history of falls and rib fractures secondary to falls. Patient takes a baby aspirin daily. No known drug allergies.     REVIEW OF SYSTEMS  Pertinent negatives include no neck pain. As above, all other systems reviewed and are negative.   See HPI for further details.     PAST MEDICAL HISTORY   has a past medical history of Fall, Hypertension, and Urinary urgency (2021).    SURGICAL HISTORY   has a past surgical history that includes other orthopedic surgery and other.    SOCIAL HISTORY  Social History     Tobacco Use   • Smoking status: Former Smoker     Types: Cigarettes, Pipe, Cigars     Start date: 1963     Quit date: 2003     Years since quittin.4   • Smokeless tobacco: Former User   Vaping Use   • Vaping Use: Never used   Substance Use Topics   • Alcohol use: Not Currently     Alcohol/week: 0.0 oz     Comment: patient no longer drinks alcohol   • Drug use: Never      Social History     Substance and Sexual Activity   Drug Use Never       FAMILY HISTORY  Family History   Problem Relation Age of Onset   • Glaucoma Brother        CURRENT MEDICATIONS  Home Medications     Reviewed by Alysha Hardy (Nurse  Apprentice) on 07/19/22 at 1114  Med List Status: <None>   Medication Last Dose Status   acetaminophen (TYLENOL) 325 MG Tab  Active   aspirin EC (ECOTRIN) 81 MG Tablet Delayed Response  Active   ibuprofen (MOTRIN) 400 MG Tab  Active   lisinopril (PRINIVIL) 10 MG Tab  Active   simvastatin (ZOCOR) 20 MG Tab  Active   terazosin (HYTRIN) 5 MG Cap  Active                ALLERGIES  No Known Allergies    PHYSICAL EXAM  VITAL SIGNS: /77   Pulse (!) 59   Temp 36.4 °C (97.6 °F) (Temporal)   Resp 18   Ht (P) 1.829 m (6')   Wt (P) 86.2 kg (190 lb)   SpO2 93%   BMI (P) 25.77 kg/m²      Constitutional: Elderly male, Well developed, Well nourished, No acute distress, Non-toxic appearance.   HENT: Normocephalic, Bilateral external ears normal, Oropharynx is clear, dry mucous membranes. No oral exudates or nasal discharge. There is a substantial laceration just below the left eyebrow that is about 7 cm. Patient is able to fully open up his left eye with the upper eyelid.    Eyes: Pupils are equal round and reactive, EOMI, Conjunctiva normal, No discharge.   Neck: Normal range of motion, No tenderness, Supple, No stridor. No meningismus.  Lymphatic: No lymphadenopathy noted.   Cardiovascular: Regular rate and rhythm without murmur rub or gallop.  Thorax & Lungs: Clear breath sounds bilaterally without wheezes, rhonchi or rales. There is no chest wall tenderness.   Abdomen: Soft non-tender non-distended. There is no rebound or guarding. No organomegaly is appreciated. Bowel sounds are normal.  Skin: Normal without rash. See HENT.   Back: No CVA or spinal tenderness.   Extremities: Intact distal pulses, No edema, No tenderness, No cyanosis, No clubbing. Capillary refill is less than 2 seconds.  Musculoskeletal: Good range of motion in all major joints. No tenderness to palpation or major deformities noted.   Neurologic: Alert & oriented x 3, Normal motor function, Normal sensory function, No focal deficits noted. Reflexes  are normal. Able to follow commands.   Psychiatric: Affect normal, Judgment normal, Mood normal. There is no suicidal ideation or patient reported hallucinations.     DIAGNOSTIC STUDIES / PROCEDURES    LABS  Labs Reviewed   CBC WITH DIFFERENTIAL - Abnormal; Notable for the following components:       Result Value    RBC 4.66 (*)     Lymphocytes 17.80 (*)     All other components within normal limits   COMP METABOLIC PANEL - Abnormal; Notable for the following components:    Glucose 154 (*)     Bun 24 (*)     Creatinine 1.76 (*)     All other components within normal limits   TROPONIN - Abnormal; Notable for the following components:    Troponin T 23 (*)     All other components within normal limits   ESTIMATED GFR - Abnormal; Notable for the following components:    GFR (CKD-EPI) 39 (*)     All other components within normal limits   TSH   FREE THYROXINE      All labs reviewed by me.      RADIOLOGY  DX-CHEST-PORTABLE (1 VIEW)   Final Result      No acute cardiopulmonary abnormality.      CT-HEAD W/O   Final Result      1. Acute nondisplaced fractures of the lateral and medial walls of the right maxillary sinus, with air-fluid levels in the right maxillary sinus and posterior ethmoid air cells bilaterally.   2. No acute intracranial hemorrhage.   3. Stable age-related central and cortical atrophy and diffuse chronic microangiopathic white matter changes.           The radiologist's interpretation of all radiological studies have been reviewed by me.    Laceration Repair Procedure Note    Indication: Laceration    Procedure: The patient was placed in the appropriate position and anesthesia around the laceration was obtained by infiltration using 1% Lidocaine with epinephrine. The area was then cleansed with betadine and draped in a sterile fashion. The wound was explored and no foreign body/bodies was/were found. The laceration was closed with 4-0 Ethilon using interrupted sutures. There were no additional lacerations  requiring repair. The wound area was then dressed with bacitracin.       Total repaired wound length: 7 cm.     Other Items: 7 sutures     The patient tolerated the procedure well.    Complications: None      COURSE & MEDICAL DECISION MAKING  Nursing notes, VS, PMSFHx reviewed in chart.    10:17 AM Patient seen and examined at charge desk as a TBI. After my exam, patient was taken straight to imaging. Ordered for DX-chest, CT-head without, EKG, TSH, Free thyroxine, CBC with diff, CMP, Estimated GFR, and Troponin STAT to evaluate. Patient was treated with NS infusion 1000 mL for his symptoms.    12:17 PM - Patient was reevaluated at bedside. Discussed lab and radiology results with the patient and informed them that he had sustained a few fractures. I updated him on the plan of care, which includes numbing his laceration, cleaning it, and then repairing it. Patient understands and verbalizes agreement to plan of care.     Laboratory evaluation reveals no leukocytosis, shift, anemia, electrolyte derangements or acidosis.  Some mild renal insufficiency is new with a BUN/creatinine of 24 and 1.76.    CT scan of the head shows no evidence of intracranial bleed or skull fracture but there is some facial fractures identified at the right maxilla that are minimally displaced and I will place him on  Augmentin for a week    12:33 PM - Patient was reevaluated at bedside. I completed a laceration repair. See above note. After the procedure, I explained to the patient that we will clean him up. I then informed the patient of my plan for discharge, which includes strict return precautions for any new or worsening symptoms. Patient understands and verbalizes agreement to plan of care. Patient is comfortable going home at this time.     HYDRATION: Based on the patient's presentation of Dehydration the patient was given IV fluids. IV Hydration was used because oral hydration was not adequate alone. Upon recheck following hydration,  the patient was improved.    The patient will return for new or worsening symptoms and is stable at the time of discharge.  I prescribed him Augmentin to take for the next week and he will need outpatient follow-up with facial fracture, Dr. Rao but his fractures appear to be nonsurgical    DISPOSITION:  Patient will be discharged home in stable condition.  He is slated to go home to skilled nurse facility at 6:30 PM.  At this point he has been in decline in terms of his ambulation status and I do not think there is indication to bring him in the hospital and rather I believe he is now transitioning to being bedbound as he is high fall risk    FINAL IMPRESSION  1. Fall from ground level    2. Closed fracture of right side of maxilla, initial encounter ([facility])    3. Facial laceration, initial encounter    4.      Laceration Repair, face, 7 cm with suture repair     Brijesh LOPEZ (Scribtoni), am scribing for, and in the presence of, Carlos Manuel Garcia M.D..    Electronically signed by: Brijesh Daugherty (Marlys), 7/19/2022    ICarlos Manuel M.D. personally performed the services described in this documentation, as scribed by Brijesh Daugherty in my presence, and it is both accurate and complete.    The note accurately reflects work and decisions made by me.  Carlos Manuel Garcia M.D.  7/19/2022  4:32 PM

## 2022-07-19 NOTE — DISCHARGE INSTRUCTIONS
Suture removal in 7 days time.  Augmentin for 7 days  Follow-up with facial fractures Dr. Rao  There does not appear to be a cardiac cause for this fall based on work-up  Patient needs further evaluation of gait at nursing facility to modify approach and may be even use a different style of walker or set of shoes

## 2022-07-20 NOTE — ED NOTES
[facility] arrived  [place] facility aware pt is being transported back  Pt belongings and discharge papers with medication script given to [facility] staff  Pt vss, nad, leaving via wheelchair

## 2022-07-20 NOTE — ED NOTES
Pt asking about transport. Verbalized to pt that transport is scheduled for approximately 18:30. Pt verbalized understanding.

## 2022-11-15 ENCOUNTER — HOSPITAL ENCOUNTER (OUTPATIENT)
Facility: MEDICAL CENTER | Age: 77
End: 2022-11-15
Attending: FAMILY MEDICINE
Payer: COMMERCIAL

## 2022-11-16 LAB
APPEARANCE UR: CLEAR
BACTERIA #/AREA URNS HPF: ABNORMAL /HPF
BILIRUB UR QL STRIP.AUTO: NEGATIVE
CAOX CRY #/AREA URNS HPF: ABNORMAL /HPF
COLOR UR: YELLOW
EPI CELLS #/AREA URNS HPF: NEGATIVE /HPF
GLUCOSE UR STRIP.AUTO-MCNC: NEGATIVE MG/DL
HYALINE CASTS #/AREA URNS LPF: ABNORMAL /LPF
KETONES UR STRIP.AUTO-MCNC: NEGATIVE MG/DL
LEUKOCYTE ESTERASE UR QL STRIP.AUTO: ABNORMAL
MICRO URNS: ABNORMAL
NITRITE UR QL STRIP.AUTO: POSITIVE
PH UR STRIP.AUTO: 5.5 [PH] (ref 5–8)
PROT UR QL STRIP: NEGATIVE MG/DL
RBC # URNS HPF: ABNORMAL /HPF
RBC UR QL AUTO: ABNORMAL
SP GR UR STRIP.AUTO: 1.02
UROBILINOGEN UR STRIP.AUTO-MCNC: 0.2 MG/DL
WBC #/AREA URNS HPF: ABNORMAL /HPF

## 2022-11-19 LAB
BACTERIA UR CULT: ABNORMAL
BACTERIA UR CULT: ABNORMAL
SIGNIFICANT IND 70042: ABNORMAL
SITE SITE: ABNORMAL
SOURCE SOURCE: ABNORMAL

## 2023-03-17 ENCOUNTER — HOSPITAL ENCOUNTER (OUTPATIENT)
Facility: MEDICAL CENTER | Age: 78
End: 2023-03-17
Attending: FAMILY MEDICINE
Payer: COMMERCIAL

## 2023-03-17 LAB
AMORPH CRY #/AREA URNS HPF: PRESENT /HPF
APPEARANCE UR: ABNORMAL
BACTERIA #/AREA URNS HPF: ABNORMAL /HPF
BILIRUB UR QL STRIP.AUTO: NEGATIVE
COLOR UR: YELLOW
EPI CELLS #/AREA URNS HPF: NEGATIVE /HPF
GLUCOSE UR STRIP.AUTO-MCNC: NEGATIVE MG/DL
HYALINE CASTS #/AREA URNS LPF: ABNORMAL /LPF
KETONES UR STRIP.AUTO-MCNC: NEGATIVE MG/DL
LEUKOCYTE ESTERASE UR QL STRIP.AUTO: ABNORMAL
MICRO URNS: ABNORMAL
NITRITE UR QL STRIP.AUTO: POSITIVE
PH UR STRIP.AUTO: 6 [PH] (ref 5–8)
PROT UR QL STRIP: 30 MG/DL
RBC # URNS HPF: ABNORMAL /HPF
RBC UR QL AUTO: ABNORMAL
RENAL EPI CELLS #/AREA URNS HPF: ABNORMAL /HPF
SP GR UR STRIP.AUTO: 1.02
TRANS CELLS #/AREA URNS HPF: ABNORMAL /HPF
UROBILINOGEN UR STRIP.AUTO-MCNC: 0.2 MG/DL
WBC #/AREA URNS HPF: ABNORMAL /HPF

## 2023-07-23 ENCOUNTER — HOSPITAL ENCOUNTER (OUTPATIENT)
Facility: MEDICAL CENTER | Age: 78
End: 2023-07-23
Attending: NURSE PRACTITIONER
Payer: COMMERCIAL

## 2023-09-21 ENCOUNTER — HOSPITAL ENCOUNTER (OUTPATIENT)
Facility: MEDICAL CENTER | Age: 78
End: 2023-09-21
Attending: FAMILY MEDICINE
Payer: COMMERCIAL

## 2023-10-18 NOTE — ED TRIAGE NOTES
Patient lives at a facility was walking, fell, hit his head, positive loc approx 1 min.  Noted josé luis on the cardiac monitor.  Laceration to the left eye   
No

## 2023-11-07 ENCOUNTER — HOSPITAL ENCOUNTER (OUTPATIENT)
Facility: MEDICAL CENTER | Age: 78
End: 2023-11-07
Attending: FAMILY MEDICINE
Payer: MEDICARE

## 2023-11-07 PROCEDURE — 87186 SC STD MICRODIL/AGAR DIL: CPT

## 2023-11-07 PROCEDURE — 87086 URINE CULTURE/COLONY COUNT: CPT

## 2023-11-07 PROCEDURE — 87077 CULTURE AEROBIC IDENTIFY: CPT

## 2024-03-06 ENCOUNTER — HOSPITAL ENCOUNTER (OUTPATIENT)
Facility: MEDICAL CENTER | Age: 79
End: 2024-03-06
Attending: FAMILY MEDICINE
Payer: COMMERCIAL

## 2024-03-06 LAB
ANION GAP SERPL CALC-SCNC: 14 MMOL/L (ref 7–16)
BUN SERPL-MCNC: 25 MG/DL (ref 8–22)
CALCIUM SERPL-MCNC: 9.1 MG/DL (ref 8.5–10.5)
CHLORIDE SERPL-SCNC: 110 MMOL/L (ref 96–112)
CO2 SERPL-SCNC: 18 MMOL/L (ref 20–33)
CREAT SERPL-MCNC: 1.11 MG/DL (ref 0.5–1.4)
ERYTHROCYTE [DISTWIDTH] IN BLOOD BY AUTOMATED COUNT: 43.2 FL (ref 35.9–50)
GFR SERPLBLD CREATININE-BSD FMLA CKD-EPI: 67 ML/MIN/1.73 M 2
GLUCOSE SERPL-MCNC: 78 MG/DL (ref 65–99)
HCT VFR BLD AUTO: 42.5 % (ref 42–52)
HGB BLD-MCNC: 14.3 G/DL (ref 14–18)
MCH RBC QN AUTO: 31.3 PG (ref 27–33)
MCHC RBC AUTO-ENTMCNC: 33.6 G/DL (ref 32.3–36.5)
MCV RBC AUTO: 93 FL (ref 81.4–97.8)
PLATELET # BLD AUTO: 206 K/UL (ref 164–446)
PMV BLD AUTO: 10.6 FL (ref 9–12.9)
POTASSIUM SERPL-SCNC: 4.3 MMOL/L (ref 3.6–5.5)
RBC # BLD AUTO: 4.57 M/UL (ref 4.7–6.1)
SODIUM SERPL-SCNC: 142 MMOL/L (ref 135–145)
WBC # BLD AUTO: 8.1 K/UL (ref 4.8–10.8)

## 2024-03-08 ENCOUNTER — HOSPITAL ENCOUNTER (OUTPATIENT)
Facility: MEDICAL CENTER | Age: 79
End: 2024-03-08
Attending: FAMILY MEDICINE
Payer: MEDICARE

## 2024-03-08 PROCEDURE — 87186 SC STD MICRODIL/AGAR DIL: CPT

## 2024-03-08 PROCEDURE — 87086 URINE CULTURE/COLONY COUNT: CPT

## 2024-03-08 PROCEDURE — 87077 CULTURE AEROBIC IDENTIFY: CPT

## 2024-03-18 ENCOUNTER — HOSPITAL ENCOUNTER (EMERGENCY)
Facility: MEDICAL CENTER | Age: 79
End: 2024-03-18
Attending: EMERGENCY MEDICINE
Payer: MEDICARE

## 2024-03-18 ENCOUNTER — APPOINTMENT (OUTPATIENT)
Dept: RADIOLOGY | Facility: MEDICAL CENTER | Age: 79
End: 2024-03-18
Attending: EMERGENCY MEDICINE
Payer: MEDICARE

## 2024-03-18 VITALS
OXYGEN SATURATION: 96 % | HEIGHT: 72 IN | BODY MASS INDEX: 25.73 KG/M2 | DIASTOLIC BLOOD PRESSURE: 56 MMHG | SYSTOLIC BLOOD PRESSURE: 104 MMHG | HEART RATE: 60 BPM | TEMPERATURE: 98.7 F | WEIGHT: 190 LBS | RESPIRATION RATE: 18 BRPM

## 2024-03-18 DIAGNOSIS — S60.512A ABRASION OF LEFT HAND, INITIAL ENCOUNTER: ICD-10-CM
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
## 2024-03-18 DIAGNOSIS — S80.812A ABRASION OF LEFT LOWER EXTREMITY, INITIAL ENCOUNTER: ICD-10-CM

## 2024-03-18 DIAGNOSIS — S00.01XA ABRASION OF SCALP, INITIAL ENCOUNTER: ICD-10-CM

## 2024-03-18 DIAGNOSIS — S09.90XA CLOSED HEAD INJURY, INITIAL ENCOUNTER: ICD-10-CM

## 2024-03-18 LAB
ALBUMIN SERPL BCP-MCNC: 3.9 G/DL (ref 3.2–4.9)
ALBUMIN/GLOB SERPL: 1.3 G/DL
ALP SERPL-CCNC: 87 U/L (ref 30–99)
ALT SERPL-CCNC: 34 U/L (ref 2–50)
ANION GAP SERPL CALC-SCNC: 10 MMOL/L (ref 7–16)
APTT PPP: 31.4 SEC (ref 24.7–36)
AST SERPL-CCNC: 33 U/L (ref 12–45)
BASOPHILS # BLD AUTO: 0.9 % (ref 0–1.8)
BASOPHILS # BLD: 0.09 K/UL (ref 0–0.12)
BILIRUB SERPL-MCNC: 0.5 MG/DL (ref 0.1–1.5)
BUN SERPL-MCNC: 25 MG/DL (ref 8–22)
CALCIUM ALBUM COR SERPL-MCNC: 9.8 MG/DL (ref 8.5–10.5)
CALCIUM SERPL-MCNC: 9.7 MG/DL (ref 8.5–10.5)
CHLORIDE SERPL-SCNC: 110 MMOL/L (ref 96–112)
CO2 SERPL-SCNC: 25 MMOL/L (ref 20–33)
CREAT SERPL-MCNC: 1.32 MG/DL (ref 0.5–1.4)
EKG IMPRESSION: NORMAL
EOSINOPHIL # BLD AUTO: 0.32 K/UL (ref 0–0.51)
EOSINOPHIL NFR BLD: 3.2 % (ref 0–6.9)
ERYTHROCYTE [DISTWIDTH] IN BLOOD BY AUTOMATED COUNT: 44.9 FL (ref 35.9–50)
GFR SERPLBLD CREATININE-BSD FMLA CKD-EPI: 54 ML/MIN/1.73 M 2
GLOBULIN SER CALC-MCNC: 3 G/DL (ref 1.9–3.5)
GLUCOSE SERPL-MCNC: 98 MG/DL (ref 65–99)
HCT VFR BLD AUTO: 47.9 % (ref 42–52)
HGB BLD-MCNC: 16.2 G/DL (ref 14–18)
IMM GRANULOCYTES # BLD AUTO: 0.08 K/UL (ref 0–0.11)
IMM GRANULOCYTES NFR BLD AUTO: 0.8 % (ref 0–0.9)
INR PPP: 1.08 (ref 0.87–1.13)
LYMPHOCYTES # BLD AUTO: 2.1 K/UL (ref 1–4.8)
LYMPHOCYTES NFR BLD: 20.8 % (ref 22–41)
MCH RBC QN AUTO: 31.3 PG (ref 27–33)
MCHC RBC AUTO-ENTMCNC: 33.8 G/DL (ref 32.3–36.5)
MCV RBC AUTO: 92.6 FL (ref 81.4–97.8)
MONOCYTES # BLD AUTO: 1.01 K/UL (ref 0–0.85)
MONOCYTES NFR BLD AUTO: 10 % (ref 0–13.4)
NEUTROPHILS # BLD AUTO: 6.51 K/UL (ref 1.82–7.42)
NEUTROPHILS NFR BLD: 64.3 % (ref 44–72)
NRBC # BLD AUTO: 0 K/UL
NRBC BLD-RTO: 0 /100 WBC (ref 0–0.2)
PLATELET # BLD AUTO: 212 K/UL (ref 164–446)
PMV BLD AUTO: 10.4 FL (ref 9–12.9)
POTASSIUM SERPL-SCNC: 4.3 MMOL/L (ref 3.6–5.5)
PROT SERPL-MCNC: 6.9 G/DL (ref 6–8.2)
PROTHROMBIN TIME: 14.1 SEC (ref 12–14.6)
RBC # BLD AUTO: 5.17 M/UL (ref 4.7–6.1)
SODIUM SERPL-SCNC: 145 MMOL/L (ref 135–145)
WBC # BLD AUTO: 10.1 K/UL (ref 4.8–10.8)

## 2024-03-18 PROCEDURE — 72125 CT NECK SPINE W/O DYE: CPT

## 2024-03-18 PROCEDURE — 93005 ELECTROCARDIOGRAM TRACING: CPT | Performed by: EMERGENCY MEDICINE

## 2024-03-18 PROCEDURE — 99285 EMERGENCY DEPT VISIT HI MDM: CPT

## 2024-03-18 PROCEDURE — 36415 COLL VENOUS BLD VENIPUNCTURE: CPT

## 2024-03-18 PROCEDURE — 71045 X-RAY EXAM CHEST 1 VIEW: CPT

## 2024-03-18 PROCEDURE — 85610 PROTHROMBIN TIME: CPT

## 2024-03-18 PROCEDURE — 80053 COMPREHEN METABOLIC PANEL: CPT

## 2024-03-18 PROCEDURE — 85025 COMPLETE CBC W/AUTO DIFF WBC: CPT

## 2024-03-18 PROCEDURE — 85730 THROMBOPLASTIN TIME PARTIAL: CPT

## 2024-03-18 PROCEDURE — 70450 CT HEAD/BRAIN W/O DYE: CPT

## 2024-03-18 ASSESSMENT — FIBROSIS 4 INDEX: FIB4 SCORE: 2.34

## 2024-03-18 ASSESSMENT — PAIN DESCRIPTION - PAIN TYPE: TYPE: ACUTE PAIN

## 2024-03-18 NOTE — ED NOTES
Removed old steri strips from prior facility and irrigated skin tear. Applied adaptic and roll guaze to skin tear on left hand. Also irrigated laceration on the top of head. Notified ERP that irrigation of the head lac is complete. PT tolerated both procedures well. Call light within reach. And bed locked in lowest position.

## 2024-03-18 NOTE — DISCHARGE PLANNING
JHONY received RN request via voicemail to set up transportation for pt to go back to [facility].  JHONY called and spoke with [facility] admission person and was told patient is eligible to return.  JHONY called [facility] and set up a wheelchair ride for 10-1030AM.  RN notified.    JHONY working on COBRA, will deliver to pt hard chart upon completion.    JHONY handed COBRA to RN who came to ask about it

## 2024-03-18 NOTE — ED NOTES
Attempted to call [facility] [phone], to obtain baseline mental status. No answer.

## 2024-03-18 NOTE — ED NOTES
Bedside report received, pt oriented to self, place and time, does not remember falling and hitting his head, denies any pain, VSS.

## 2024-03-18 NOTE — ED PROVIDER NOTES
"CHIEF COMPLAINT  Chief Complaint   Patient presents with    GLF     TBI, +thinners, -LOC       LIMITATION TO HISTORY   Select: Altered mental status    HPI    Josse Valdez is a 79 y.o. male who presents to the Emergency Department for evaluation of a TBI. The patient is currently a resident at [facility] for comfort care. The nursing staff reports that the patient had a ground level fall from his bed at around 5:30 this morning. The patient hit his head upon falling. No loss of consciousness is reported. The patient has dementia and is usually A&O x 3 at baseline but per nursing staff he is more altered this morning. EMS reports that the patient has been speaking sometimes with words that do not make sense which is not baseline. When asked what year the patient replied \".\" The patient is evaluated to be A&O x 2 and he has a C-collar in place. The patient is comfort care per his POLST intervention.     OUTSIDE HISTORIAN(S):  Select: EMS provided history    EXTERNAL RECORDS REVIEWED  Select: The patient was seen for a GLF in 2022 with loss of consciousness  and  laceration to left forehead. He was evaluated to be A&O x 3 and able to follow commands.     PAST MEDICAL HISTORY  Past Medical History:   Diagnosis Date    Fall     fell at home for current hospital admission: fell carrying wood to his wood stove in his home    Hypertension     Urinary urgency 2021     .    SURGICAL HISTORY  Past Surgical History:   Procedure Laterality Date    OTHER      sking graft done on left hand     OTHER ORTHOPEDIC SURGERY      right knee surgery x3 (full knee most recent)         FAMILY HISTORY  Family History   Problem Relation Age of Onset    Glaucoma Brother           SOCIAL HISTORY  Social History     Tobacco Use    Smoking status: Former     Current packs/day: 0.00     Types: Cigarettes, Pipe, Cigars     Start date: 1963     Quit date: 2003     Years since quittin.1    " Smokeless tobacco: Former   Vaping Use    Vaping Use: Never used   Substance Use Topics    Alcohol use: Not Currently     Alcohol/week: 0.0 oz     Comment: patient no longer drinks alcohol    Drug use: Never         CURRENT MEDICATIONS  Current Outpatient Medications   Medication Instructions    acetaminophen (TYLENOL) 650 mg, Oral, EVERY 6 HOURS PRN    amoxicillin-clavulanate (AUGMENTIN) 875-125 MG Tab 1 Tablet, Oral, 2 TIMES DAILY    aspirin EC (ECOTRIN) 81 mg, Oral, DAILY    ibuprofen (MOTRIN) 400 mg, Oral, EVERY 6 HOURS PRN    lisinopril (PRINIVIL) 10 mg, Oral, DAILY    simvastatin (ZOCOR) 20 mg, Oral, NIGHTLY    terazosin (HYTRIN) 5 mg, Oral, NIGHTLY      ALLERGIES  No Known Allergies    PHYSICAL EXAM  VITAL SIGNS:BP (!) 174/71   Pulse (!) 58   Temp 37.1 °C (98.7 °F) (Temporal)   Resp 18   Ht 1.829 m (6')   Wt 86.2 kg (190 lb)   SpO2 98%   BMI 25.77 kg/m²       Constitutional: Well-developed no acute distress   HENT: Cephalohematoma right forehead with an abrasion nonsuturable, Normocephalic, Bilateral external ears normal.  Eyes:  conjunctiva are normal.   Neck: Supple.  Nontender midline  Cardiovascular: Regular rate and rhythm without murmurs gallops or rubs.   Thorax & Lungs: No respiratory distress. Breathing comfortably. Lungs are clear to auscultation bilaterally, there are no wheezes no rales. Chest wall is nontender.  Abdomen: Soft, non distended, non tender   Skin: Abrasion to left anterior hand and left shin, Warm, Dry, No erythema.   Back: No tenderness, No CVA tenderness.  Musculoskeletal: No clubbing cyanosis or edema good range of motion   Neurologic: A&O x 2 person place not time,  Moves all four extremities no drift, normal sensation.  Psychiatric: Affect normal, Judgment normal, Mood normal.     DIAGNOSTIC STUDIES / PROCEDURES        LABS  Results for orders placed or performed during the hospital encounter of 03/18/24   CBC WITH DIFFERENTIAL   Result Value Ref Range    WBC 10.1 4.8 -  10.8 K/uL    RBC 5.17 4.70 - 6.10 M/uL    Hemoglobin 16.2 14.0 - 18.0 g/dL    Hematocrit 47.9 42.0 - 52.0 %    MCV 92.6 81.4 - 97.8 fL    MCH 31.3 27.0 - 33.0 pg    MCHC 33.8 32.3 - 36.5 g/dL    RDW 44.9 35.9 - 50.0 fL    Platelet Count 212 164 - 446 K/uL    MPV 10.4 9.0 - 12.9 fL    Neutrophils-Polys 64.30 44.00 - 72.00 %    Lymphocytes 20.80 (L) 22.00 - 41.00 %    Monocytes 10.00 0.00 - 13.40 %    Eosinophils 3.20 0.00 - 6.90 %    Basophils 0.90 0.00 - 1.80 %    Immature Granulocytes 0.80 0.00 - 0.90 %    Nucleated RBC 0.00 0.00 - 0.20 /100 WBC    Neutrophils (Absolute) 6.51 1.82 - 7.42 K/uL    Lymphs (Absolute) 2.10 1.00 - 4.80 K/uL    Monos (Absolute) 1.01 (H) 0.00 - 0.85 K/uL    Eos (Absolute) 0.32 0.00 - 0.51 K/uL    Baso (Absolute) 0.09 0.00 - 0.12 K/uL    Immature Granulocytes (abs) 0.08 0.00 - 0.11 K/uL    NRBC (Absolute) 0.00 K/uL   COMP METABOLIC PANEL   Result Value Ref Range    Sodium 145 135 - 145 mmol/L    Potassium 4.3 3.6 - 5.5 mmol/L    Chloride 110 96 - 112 mmol/L    Co2 25 20 - 33 mmol/L    Anion Gap 10.0 7.0 - 16.0    Glucose 98 65 - 99 mg/dL    Bun 25 (H) 8 - 22 mg/dL    Creatinine 1.32 0.50 - 1.40 mg/dL    Calcium 9.7 8.5 - 10.5 mg/dL    Correct Calcium 9.8 8.5 - 10.5 mg/dL    AST(SGOT) 33 12 - 45 U/L    ALT(SGPT) 34 2 - 50 U/L    Alkaline Phosphatase 87 30 - 99 U/L    Total Bilirubin 0.5 0.1 - 1.5 mg/dL    Albumin 3.9 3.2 - 4.9 g/dL    Total Protein 6.9 6.0 - 8.2 g/dL    Globulin 3.0 1.9 - 3.5 g/dL    A-G Ratio 1.3 g/dL   PROTHROMBIN TIME   Result Value Ref Range    PT 14.1 12.0 - 14.6 sec    INR 1.08 0.87 - 1.13   APTT   Result Value Ref Range    APTT 31.4 24.7 - 36.0 sec   ESTIMATED GFR   Result Value Ref Range    GFR (CKD-EPI) 54 (A) >60 mL/min/1.73 m 2   EKG (NOW)   Result Value Ref Range    Report       [facility] Emergency Dept.    Test Date:  2024-03-18  Pt Name:    EVELIA WILSON              Department: ER  MRN:        [id number]                      Room:         14  Gender:     Male                         Technician: [id number]  :        1945                   Requested By:DYLAN MEEHAN  Order #:    [id number]                    Reading MD: DYLAN MEEHAN MD    Measurements  Intervals                                Axis  Rate:       59                           P:          62  WA:         144                          QRS:        23  QRSD:       157                          T:          15  QT:         439  QTc:        435    Interpretive Statements  Sinus bradycardia  Right bundle branch block  No previous ECG available for comparison  Electronically Signed On 2024 07:43:16 PDT by DYLAN MEEHAN MD         EKG:   I have independently interpreted this EKG as detailed above.      RADIOLOGY  I have independently interpreted the diagnostic imaging associated with this visit and am waiting the final reading from the radiologist.   My preliminary interpretation is as follows: Chest x-ray shows no significant infiltrate      Radiologist interpretation:   DX-CHEST-PORTABLE (1 VIEW)   Final Result         1.  No acute cardiopulmonary disease.   2.  Cardiomegaly   3.  Atherosclerosis      CT-CSPINE WITHOUT PLUS RECONS   Final Result         1.  Multilevel degenerative changes of the cervical spine limit diagnostic sensitivity of this examination, otherwise no acute traumatic bony injury of the cervical spine is apparent.   2.  Partial opacification left mastoid air cells, consider mastoid effusion or changes of mastoiditis in the appropriate setting.   3.  Atherosclerosis      CT-HEAD W/O   Final Result         1.  No acute intracranial abnormality is identified, there are nonspecific white matter changes, commonly associated with small vessel ischemic disease.  Associated mild cerebral atrophy is noted.   2.  Atherosclerosis.              COURSE & MEDICAL DECISION MAKING    ED COURSE:    ED Observation Status? No, The patient does not qualify for observation  "status    INTERVENTIONS BY ME:  Medications - No data to display    6:09 AM - Patient seen and examined at bedside. Discussed plan of care, including labs and imaging. Ordered for DX-Chest, CT-Head w/o, Ct- Cspine, Cbc with diff, CMP, Prothrombin, APTT, and EKG to evaluate his symptoms.       INITIAL ASSESSMENT, COURSE AND PLAN  Care Narrative: The patient is a 79 year old male who is currently a patient at the [facility]. The patient has dementia at baseline but is more altered today.  The patient had a ground level fall from his bed this morning. He sustained a laceration to his right forehead, left shin and left hand.     6:57 AM - Patient was reevaluated at bedside. The patient's CT was negative for traumatic injury. Examined the patient's head laceration. The patient states that he \"feels good.\" When asked the year he states \"1923.\"  Moving all four extremities still at baseline from initial evaluation.     7:40 AM - Patient answered correct month when asked by nurse and was able to relay history of fall. He has improved to A & O x 3.      ADDITIONAL PROBLEM LIST  Patient presents for evaluation.  Initially he is alert and oriented to person not place time or events.  Apparently his baseline is alert and oriented x 3.  Does have a cephalhematoma.  He is moving all 4 extremities.  Other than his memory NIH is 1 just because of his altered mental status he can only get the year right not the month the rest of the examination is nonfocal.  Patient had a CT scan done of the head no signs of significant traumatic injuries.  CT of the C-spine was also no signs of fractures.  Laboratory studies were done just to evaluate for other abnormalities that may have caused the fall.  Electrolytes are normal EKG is unchanged.  At this point patient has been reevaluated he is actually improving from baseline so I do feel its most likely concussive force.  He is alert to person place and year still cannot " get the month does have a apparently an underlying history of dementia.  At this point I do not feel that there is any reason to keep the patient in the hospital he is comfort care per his wishes on a POLST and I do feel the patient is stable for transfer back to his care facility.    DISPOSITION AND DISCUSSIONS  I have discussed management of the patient with the following physicians/ ILIANA's/ ancillary staff:  None    Barriers to care at this time, including but not limited to: Altered mental status.     Decision tools and prescription drugs considered including, but not limited to: NIH.    The patient will return for new or worsening symptoms and is stable at the time of discharge.    The patient is referred to a primary physician for blood pressure management, diabetic screening, and for all other preventative health concerns.    DISPOSITION:  Patient will be discharged home in stable condition.    FOLLOW UP:  FLOR Lazaro  [address] [facility] [place] [address]  [phone]    Schedule an appointment as soon as possible for a visit   As needed      OUTPATIENT MEDICATIONS:  New Prescriptions    No medications on file        FINAL DIAGNOSIS  1. Fall, initial encounter    2. Closed head injury, initial encounter    3. Abrasion of scalp, initial encounter    4. Abrasion of left hand, initial encounter    5. Abrasion of left lower extremity, initial encounter         Liat LOPEZ (Marlys), am scribing for, and in the presence of, Dean Rodriguez M.D..    Electronically signed by: Liat Larsen (Marlys), 3/18/2024    IDean M.D. personally performed the services described in this documentation, as scribed by Liat Larsen in my presence, and it is both accurate and complete.     Electronically signed by: Dean Rodriguez M.D.,9:34 AM 03/18/24

## 2024-03-18 NOTE — ED TRIAGE NOTES
.  Chief Complaint   Patient presents with    GLF     TBI, +thinners, -LOC       79 yr Patient WADE JEAN-BAPTISTE from [place] Skilled to Charge desk for above complaint. PER EMS patient had GLF and +head strike with a laceration and hematoma to the left forehead and a skin tear to the left wrist. Patient takes daily ASA and is normally A/O x3 and is now A/o x 1     Pt transported to CT by [facility] and the [facility] 14, Report given to primary RN     Patient and staff wearing appropriate PPE    BP (!) 165/71   Pulse 62   Temp 37.1 °C (98.7 °F) (Temporal)   Resp 18   Ht 1.829 m (6')   Wt 86.2 kg (190 lb)   SpO2 98%   BMI 25.77 kg/m²

## 2024-03-18 NOTE — ED NOTES
Pt roomed from CT, connected to monitor, pt AO to self and location. Pt unable to recall events leading to hospitalization. C-collar in place.

## 2024-03-21 ENCOUNTER — HOSPITAL ENCOUNTER (EMERGENCY)
Facility: MEDICAL CENTER | Age: 79
End: 2024-03-21
Attending: EMERGENCY MEDICINE
Payer: MEDICARE

## 2024-03-21 VITALS
SYSTOLIC BLOOD PRESSURE: 126 MMHG | BODY MASS INDEX: 25.73 KG/M2 | HEIGHT: 72 IN | OXYGEN SATURATION: 90 % | RESPIRATION RATE: 14 BRPM | WEIGHT: 190 LBS | HEART RATE: 72 BPM | TEMPERATURE: 97.8 F | DIASTOLIC BLOOD PRESSURE: 59 MMHG

## 2024-03-21 DIAGNOSIS — R41.82 ALTERED MENTAL STATUS, UNSPECIFIED ALTERED MENTAL STATUS TYPE: ICD-10-CM

## 2024-03-21 DIAGNOSIS — F01.C0 SEVERE VASCULAR DEMENTIA WITHOUT BEHAVIORAL DISTURBANCE, PSYCHOTIC DISTURBANCE, MOOD DISTURBANCE, OR ANXIETY (HCC): ICD-10-CM

## 2024-03-21 LAB
ALBUMIN SERPL BCP-MCNC: 3.7 G/DL (ref 3.2–4.9)
ALBUMIN/GLOB SERPL: 1.3 G/DL
ALP SERPL-CCNC: 86 U/L (ref 30–99)
ALT SERPL-CCNC: 21 U/L (ref 2–50)
ANION GAP SERPL CALC-SCNC: 10 MMOL/L (ref 7–16)
APPEARANCE UR: CLEAR
AST SERPL-CCNC: 22 U/L (ref 12–45)
BACTERIA #/AREA URNS HPF: NEGATIVE /HPF
BASOPHILS # BLD AUTO: 1 % (ref 0–1.8)
BASOPHILS # BLD: 0.07 K/UL (ref 0–0.12)
BILIRUB SERPL-MCNC: 0.6 MG/DL (ref 0.1–1.5)
BILIRUB UR QL STRIP.AUTO: NEGATIVE
BUN SERPL-MCNC: 26 MG/DL (ref 8–22)
CALCIUM ALBUM COR SERPL-MCNC: 9.6 MG/DL (ref 8.5–10.5)
CALCIUM SERPL-MCNC: 9.4 MG/DL (ref 8.5–10.5)
CHLORIDE SERPL-SCNC: 105 MMOL/L (ref 96–112)
CO2 SERPL-SCNC: 24 MMOL/L (ref 20–33)
COLOR UR: YELLOW
CREAT SERPL-MCNC: 1.33 MG/DL (ref 0.5–1.4)
EOSINOPHIL # BLD AUTO: 0.21 K/UL (ref 0–0.51)
EOSINOPHIL NFR BLD: 2.9 % (ref 0–6.9)
EPI CELLS #/AREA URNS HPF: ABNORMAL /HPF
ERYTHROCYTE [DISTWIDTH] IN BLOOD BY AUTOMATED COUNT: 45.1 FL (ref 35.9–50)
GFR SERPLBLD CREATININE-BSD FMLA CKD-EPI: 54 ML/MIN/1.73 M 2
GLOBULIN SER CALC-MCNC: 2.9 G/DL (ref 1.9–3.5)
GLUCOSE SERPL-MCNC: 103 MG/DL (ref 65–99)
GLUCOSE UR STRIP.AUTO-MCNC: NEGATIVE MG/DL
HCT VFR BLD AUTO: 45 % (ref 42–52)
HGB BLD-MCNC: 14.8 G/DL (ref 14–18)
HYALINE CASTS #/AREA URNS LPF: ABNORMAL /LPF
IMM GRANULOCYTES # BLD AUTO: 0.04 K/UL (ref 0–0.11)
IMM GRANULOCYTES NFR BLD AUTO: 0.6 % (ref 0–0.9)
KETONES UR STRIP.AUTO-MCNC: NEGATIVE MG/DL
LEUKOCYTE ESTERASE UR QL STRIP.AUTO: ABNORMAL
LYMPHOCYTES # BLD AUTO: 2.24 K/UL (ref 1–4.8)
LYMPHOCYTES NFR BLD: 31.3 % (ref 22–41)
MCH RBC QN AUTO: 31 PG (ref 27–33)
MCHC RBC AUTO-ENTMCNC: 32.9 G/DL (ref 32.3–36.5)
MCV RBC AUTO: 94.1 FL (ref 81.4–97.8)
MICRO URNS: ABNORMAL
MONOCYTES # BLD AUTO: 0.83 K/UL (ref 0–0.85)
MONOCYTES NFR BLD AUTO: 11.6 % (ref 0–13.4)
NEUTROPHILS # BLD AUTO: 3.76 K/UL (ref 1.82–7.42)
NEUTROPHILS NFR BLD: 52.6 % (ref 44–72)
NITRITE UR QL STRIP.AUTO: NEGATIVE
NRBC # BLD AUTO: 0 K/UL
NRBC BLD-RTO: 0 /100 WBC (ref 0–0.2)
PH UR STRIP.AUTO: 6.5 [PH] (ref 5–8)
PLATELET # BLD AUTO: 189 K/UL (ref 164–446)
PMV BLD AUTO: 11.1 FL (ref 9–12.9)
POTASSIUM SERPL-SCNC: 4.3 MMOL/L (ref 3.6–5.5)
PROT SERPL-MCNC: 6.6 G/DL (ref 6–8.2)
PROT UR QL STRIP: NEGATIVE MG/DL
RBC # BLD AUTO: 4.78 M/UL (ref 4.7–6.1)
RBC # URNS HPF: ABNORMAL /HPF
RBC UR QL AUTO: ABNORMAL
RENAL EPI CELLS #/AREA URNS HPF: ABNORMAL /HPF
SODIUM SERPL-SCNC: 139 MMOL/L (ref 135–145)
SP GR UR STRIP.AUTO: 1.02
UROBILINOGEN UR STRIP.AUTO-MCNC: 1 MG/DL
WBC # BLD AUTO: 7.2 K/UL (ref 4.8–10.8)
WBC #/AREA URNS HPF: ABNORMAL /HPF

## 2024-03-21 PROCEDURE — 99285 EMERGENCY DEPT VISIT HI MDM: CPT

## 2024-03-21 PROCEDURE — 81001 URINALYSIS AUTO W/SCOPE: CPT

## 2024-03-21 PROCEDURE — 36415 COLL VENOUS BLD VENIPUNCTURE: CPT

## 2024-03-21 PROCEDURE — 700111 HCHG RX REV CODE 636 W/ 250 OVERRIDE (IP): Mod: JZ | Performed by: EMERGENCY MEDICINE

## 2024-03-21 PROCEDURE — 87086 URINE CULTURE/COLONY COUNT: CPT

## 2024-03-21 PROCEDURE — 85025 COMPLETE CBC W/AUTO DIFF WBC: CPT

## 2024-03-21 PROCEDURE — 96374 THER/PROPH/DIAG INJ IV PUSH: CPT

## 2024-03-21 PROCEDURE — 94760 N-INVAS EAR/PLS OXIMETRY 1: CPT

## 2024-03-21 PROCEDURE — 80053 COMPREHEN METABOLIC PANEL: CPT

## 2024-03-21 RX ORDER — CEFTRIAXONE 2 G/1
2000 INJECTION, POWDER, FOR SOLUTION INTRAMUSCULAR; INTRAVENOUS ONCE
Status: COMPLETED | OUTPATIENT
Start: 2024-03-21 | End: 2024-03-21

## 2024-03-21 RX ORDER — SULFAMETHOXAZOLE AND TRIMETHOPRIM 800; 160 MG/1; MG/1
1 TABLET ORAL EVERY 12 HOURS
Qty: 14 TABLET | Refills: 0 | Status: ACTIVE | OUTPATIENT
Start: 2024-03-21 | End: 2024-03-28

## 2024-03-21 RX ADMIN — CEFTRIAXONE SODIUM 2000 MG: 2 INJECTION, POWDER, FOR SOLUTION INTRAMUSCULAR; INTRAVENOUS at 21:10

## 2024-03-21 ASSESSMENT — FIBROSIS 4 INDEX: FIB4 SCORE: 2.16

## 2024-03-22 NOTE — ED NOTES
Patient more responsive to questioning. Currently Aox2. Aware of self and hospital. Unaware of year and current event. Patient states he does not have pain at this time. Patient able follow commands of wiggling toes and rolling ankles. He can also lift both arms and shake hands on both sides patient shows good  strength. He does respond with eyes opening when asked questions.

## 2024-03-22 NOTE — DISCHARGE INSTRUCTIONS
Back to skilled facility when transportation is available  Back to baseline per his power of   As a precautionary measure will do 1 week of twice daily Bactrim therapy given recent UTI but no bacteria identified in urine

## 2024-03-22 NOTE — ED PROVIDER NOTES
ER Provider Note    Scribed for Carlos Manuel Garcia M.d. by Shadi Espinoza. 3/21/2024  6:40 PM    Primary Care Provider: FLOR Lazaro    CHIEF COMPLAINT  Chief Complaint   Patient presents with    ALOC     EXTERNAL RECORDS REVIEWED  Inpatient Notes last admission to the hospital was back in January 2022 under the care of Dr. MINAYA general surgery this was for a fall with fracture of multiple ribs    HPI/ROS  LIMITATION TO HISTORY   Select: : None  OUTSIDE HISTORIAN(S):  Nursing staff relayed history from EMS and [facility].    Josse Valdez is a 79 y.o. male who presents to the ED via EMS from [facility] for evaluation of altered level of consciousness onset prior to arrival. Per nursing staff, the patient was seen here on the 18th and was alert and oriented x3 at that time. ta reports the patient is alert and oriented x1 at baseline but today became unresponsive, so he was brought her for evaluation. The patient denies any pain.     The patient's friend reports the patient started experiencing frequent falls a few years ago [facility] After a year and a half, the patient was told he may have had a mild stroke at some point which could be have been causing his balance issues. After a particular fall the patient broke his  ribs and punctured his lung. Since then the patient has been unable to walk on his own and has been staying in his current facility for the past 2 years. He notes the last time he saw the patient he was walking using a walker with difficulty. He reports the patient has been eating normally. The patient is unable to use the bathroom on his own. He reports the outside facility called him and informed him the patient was unresponsive when they looked at his pupils and he was declining rapidly, however here the patient looks fine compared to how he usually is.     PAST MEDICAL HISTORY  Past Medical History:   Diagnosis Date    Fall     fell at home for current hospital  admission: fell carrying wood to his wood stove in his home    Hypertension     Urinary urgency 01/01/2021       SURGICAL HISTORY  Past Surgical History:   Procedure Laterality Date    OTHER      sking graft done on left hand     OTHER ORTHOPEDIC SURGERY      right knee surgery x3 (full knee most recent)       FAMILY HISTORY  Family History   Problem Relation Age of Onset    Glaucoma Brother        SOCIAL HISTORY   reports that he quit smoking about 21 years ago. His smoking use included cigarettes, pipe, and cigars. He started smoking about 61 years ago. He has quit using smokeless tobacco. He reports that he does not currently use alcohol. He reports that he does not use drugs.    CURRENT MEDICATIONS  Previous Medications    ACETAMINOPHEN (TYLENOL) 325 MG TAB    Take 2 Tablets by mouth every 6 hours as needed.    AMOXICILLIN-CLAVULANATE (AUGMENTIN) 875-125 MG TAB    Take 1 Tablet by mouth 2 times a day.    ASPIRIN EC (ECOTRIN) 81 MG TABLET DELAYED RESPONSE    Take 81 mg by mouth every day.    IBUPROFEN (MOTRIN) 400 MG TAB    Take 1 Tablet by mouth every 6 hours as needed.    LISINOPRIL (PRINIVIL) 10 MG TAB    Take 10 mg by mouth every day.    SIMVASTATIN (ZOCOR) 20 MG TAB    Take 20 mg by mouth every evening.    TERAZOSIN (HYTRIN) 5 MG CAP    Take 5 mg by mouth every evening.       ALLERGIES  Patient has no known allergies.    PHYSICAL EXAM  /64   Pulse 66   Resp 20   Ht 1.829 m (6')   Wt 86.2 kg (190 lb)   SpO2 97%   BMI 25.77 kg/m²   Constitutional: Well developed, Well nourished, No acute distress, Ill appearing elderly male. Responds to yes or no questions. Forms some incomplete sentences with gargled speech. Follows command. Bed bound.  HENT: Normocephalic, Right sided forehead abrasion, Bilateral external ears normal, Oropharynx is clear. Dry mucous membranes. No oral exudates or nasal discharge.   Eyes: Pupils are equal round and reactive, EOMI, Conjunctiva normal, No discharge.   Neck: Normal  range of motion, No tenderness, Supple, No stridor. No meningismus.  Lymphatic: No lymphadenopathy noted.   Cardiovascular: Regular rate and rhythm without murmur rub or gallop.  Thorax & Lungs: Clear breath sounds bilaterally without wheezes, rhonchi or rales. There is no chest wall tenderness.   Abdomen: Soft non-tender non-distended. There is no rebound or guarding. No organomegaly is appreciated. Bowel sounds are normal.  Skin: Normal without rash. Ecchymotic areas to bilateral arms.  Back: No CVA or spinal tenderness.   Extremities: Intact distal pulses, No edema, No tenderness, No cyanosis, No clubbing. Capillary refill is less than 2 seconds.  Musculoskeletal: Good range of motion in all major joints. No tenderness to palpation or major deformities noted.   Neurologic: Alert & oriented x 3, Normal motor function, Normal sensory function, No focal deficits noted. Reflexes are normal.  Psychiatric: Affect normal, Judgment normal, Mood normal. There is no suicidal ideation or patient reported hallucinations.      DIAGNOSTIC STUDIES    Labs:   Labs Reviewed   COMP METABOLIC PANEL - Abnormal; Notable for the following components:       Result Value    Glucose 103 (*)     Bun 26 (*)     All other components within normal limits   URINALYSIS,CULTURE IF INDICATED - Abnormal; Notable for the following components:    Leukocyte Esterase Moderate (*)     Occult Blood Trace (*)     All other components within normal limits    Narrative:     Indication for culture:->Patient WITHOUT an indwelling Maya  catheter in place with new onset of Dysuria, Frequency,  Urgency, and/or Suprapubic pain   URINE MICROSCOPIC (W/UA) - Abnormal; Notable for the following components:    WBC 20-50 (*)     RBC 2-5 (*)     All other components within normal limits    Narrative:     Indication for culture:->Patient WITHOUT an indwelling Maya  catheter in place with new onset of Dysuria, Frequency,  Urgency, and/or Suprapubic pain   ESTIMATED  GFR - Abnormal; Notable for the following components:    GFR (CKD-EPI) 54 (*)     All other components within normal limits   CBC WITH DIFFERENTIAL   URINE CULTURE(NEW)    Narrative:     Indication for culture:->Patient WITHOUT an indwelling Maya  catheter in place with new onset of Dysuria, Frequency,  Urgency, and/or Suprapubic pain     COURSE & MEDICAL DECISION MAKING     ED Observation Status? No; Patient does not meet criteria for ED Observation.     INITIAL ASSESSMENT, COURSE AND PLAN  Care Narrative:     6:42 PM - Patient seen and examined at bedside. The patient is an 79 year old male who presents via EMS from [facility] for evaluation of altered level of consciousness onset prior to arrival. The patient is alert and oriented x1 at baseline and upon evaluation is oriented. Discussed plan of care, including talking to the patient's power of  . Patient agrees to the plan of care. Ordered for labs to evaluate his symptoms.      8:04 PM - I reevaluated the patient at bedside. The patient's friend and power of  was present. I discussed the plan of care with him and he agrees. He provided additional history at this time. See HPI for more details.    8:29 PM  -  Pharmacy informed me the patient completed a cycle of levoquin on the 17th of this month for his urinary tract infection. I reevaluated the patient at bedside and informed his friend that given the patient's lab results, I would like to give him Rocephin. The patient's friend agrees with this plan of care. The patient will be treated with Rocephin 2 g IV for infection. His friend informed me the patient is at baseline and would like to go home. I discussed plan for discharge and follow up as outlined below. The patient is stable for discharge at this time and will return for any new or worsening symptoms. Patient's friend verbalizes understanding and support with my plan for discharge.      I did place him on 1 weeks of Bactrim therapy and this  is after Rocephin given in the ER prior to transfer back to his facility.  No definitive evidence of urinary tract infection however does have some white cells without bacteria and we will treat just in case that is starting to come back    DISPOSITION AND DISCUSSIONS  I have discussed management of the patient with the following physicians and ILIANA's:  None    Discussion of management with other QHP or appropriate source(s): Pharmacy regarding what antibiotics the patient has been taking and if he is still taking them.      Escalation of care considered, and ultimately not performed: diagnostic imaging.  Initially he was thought to be a stroke workup however he seemed to be at baseline and I avoided this in the short run and was reassured by discussion with his power of  that additional imaging was not required given that he is at his baseline    Barriers to care at this time, including but not limited to:  None .     The patient will return for new or worsening symptoms and is stable at the time of discharge.    DISPOSITION:  Patient will be discharged home in stable condition.  Patient is being returned to his skilled facility and both power of  and the patient are comfortable with this plan of care    FOLLOW UP:  No follow-up provider specified.    OUTPATIENT MEDICATIONS:  New Prescriptions    SULFAMETHOXAZOLE-TRIMETHOPRIM (BACTRIM DS) 800-160 MG TABLET    Take 1 Tablet by mouth every 12 hours for 7 days.        FINAL DIAGNOSIS  1. Altered mental status, unspecified altered mental status type    2. 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    Electronically signed by: Shadi Espinoza (Marlys), 3/21/2024    Carlos Manuel LOPEZ M.D. personally performed the services described in this documentation, as scribed by Shadi Espinoza in my  presence, and it is both accurate and complete.      The note accurately reflects work and decisions made by me.  Carlos Manuel Garcia M.D.  3/21/2024  9:06 PM

## 2024-03-22 NOTE — ED NOTES
Bedside report received from EMELYN Downs assumed care of patient.  POC discussed with patient. Call light within reach, all needs addressed at this time.   Fall risk interventions in place: Move the patient closer to the nurse's station, Patient's personal possessions are with in their safe reach, Place socks on patient, Place fall risk sign on patient's door, Give patient urinal if applicable, Keep floor surfaces clean and dry, and Accompanied to restroom (all applicable per [facility] Fall risk assessment)   Continuous monitoring: Cardiac Leads, Pulse Ox, or Blood Pressure  IVF/IV medications: Not Applicable   Oxygen: Room Air  Bedside sitter: Not Applicable   Isolation: Not Applicable

## 2024-03-22 NOTE — DISCHARGE PLANNING
Received request from bedside RN that pt is medically cleared and ready to [facility] spoke with Lillie with pickup ETA [id number]. Transport packet delivered to bedside RN with reminder to [facility].

## 2024-03-22 NOTE — ED NOTES
Pt discharged back to [facility], Report given to Abbey DUNAWAY. Pt going with JERILYN.  IV discontinued and gauze placed, pt in possession of belongings. JERILYN provided discharge education and information pertaining to medications and follow up appointments. [facility]  JERILYN received copy of discharge instructions. Pt is A/O x 2-3. Pt to [place] with JERILYN.      /59   Pulse 72   Temp 36.6 °C (97.8 °F) (Temporal)   Resp 14   Ht 1.829 m (6')   Wt 86.2 kg (190 lb)   SpO2 90%   BMI 25.77 kg/m²

## 2024-03-22 NOTE — ED NOTES
Bedside report given to EMELYN Dhaliwal. Patient remains in [facility]. He is responsive when asked questions bu nurses. Patient denies pain at this time. VSS. Stable on RA.

## 2024-03-22 NOTE — DISCHARGE PLANNING
MSW responded to Red 9 per ERP request. Pt was BIB [facility] from [facility] for being unresponsive. Pt more alert in room. Pt has POLST-DNR and comfort measures only. MSW and ERP went over POLST. Pt has SID [facility] ([phone]). ERP to reach out to POA. Will remain available for support.

## 2024-03-23 LAB
BACTERIA UR CULT: NORMAL
SIGNIFICANT IND 70042: NORMAL
SITE SITE: NORMAL
SOURCE SOURCE: NORMAL

## 2024-04-09 ENCOUNTER — HOSPITAL ENCOUNTER (OUTPATIENT)
Facility: MEDICAL CENTER | Age: 79
End: 2024-04-09
Attending: FAMILY MEDICINE
Payer: COMMERCIAL

## 2024-04-09 LAB — VALPROATE SERPL-MCNC: 34.4 UG/ML (ref 50–100)

## 2024-06-24 NOTE — PROGRESS NOTES
Patient is here alone today.  If any information or results need to be relayed from today's visit, the best way to contact the patient is via [phone] (mobile) - Patient gives verbal permission to leave a detailed voicemail at the number provided.       Trauma Progress Note 1/27/2022 5:28 AM    This is a 76 y.o. male with who fell from a couch a night prior to presentation. He was found down by his neighbor after ~ 8 hrs. He sustained 4 left sided rib fractures, a left pneumothorax, and left hip fracture.   Left chest tube was placed in ICU.    Plan:   - continue multimodal pain management and aggressive pulmonary hygiene   - no acetabular fracture seen on imaging reviewed by Ortho Surgery  - diet resumed   - home medications resumed    Assessment: awake, alert, pain controled     /72   Pulse 73   Temp 36.7 °C (98 °F) (Temporal)   Resp (!) 26   Ht 1.829 m (6')   Wt 83.1 kg (183 lb 3.2 oz)   SpO2 100%   BMI 24.85 kg/m²     Urine Output: Maya catheter / adequate output    IMAGING:  DX-CHEST-LIMITED (1 VIEW)   Final Result      1.  No visible residual LEFT pneumothorax with chest tube in place   2.  Ongoing LEFT basilar atelectasis      DX-CHEST-LIMITED (1 VIEW)   Final Result      1.  Small LEFT pneumothorax, similar to outside prior radiographs   2.  LEFT basilar atelectasis, less likely contusion or other airspace disease      OUTSIDE IMAGES-DX CHEST   Final Result      OUTSIDE IMAGES-CT CHEST   Final Result      OUTSIDE IMAGES-CT CERVICAL SPINE   Final Result      OUTSIDE IMAGES-CT HEAD   Final Result      CT-FOREIGN FILM CAT SCAN   Final Result      OUTSIDE IMAGES-CT HEAD   Final Result      CT-FOREIGN FILM CAT SCAN   Final Result      DX-CHEST-PORTABLE (1 VIEW)    (Results Pending)     Recent Labs     01/26/22  2330   APTT 28.9   INR 1.44*      Traumatic pneumothorax- (present on admission)  Assessment & Plan  Small left pneumothorax.   Left chest tube inserted in ICU  Chest tube to 20 cm suction   Supplemental oxygen to maintain O2 saturations greater than 95%  Aggressive pulmonary hygiene. Serial chest radiographs.    Multiple rib fractures involving four or more ribs- (present on admission)  Assessment & Plan  Fractures of the 3rd, 4th, 5th, and  6th left ribs.  Aggressive multimodal pain management, and pulmonary hygiene.   Serial chest radiographs.    Frequent falls- (present on admission)  Assessment & Plan  PT/OT once cleared by Ortho    BPH (benign prostatic hyperplasia)- (present on admission)  Assessment & Plan  Chronic condition treated with terazosin.  Resumed maintenance medication on admission.    Hyperlipidemia- (present on admission)  Assessment & Plan  Chronic condition treated with simvastatin.  Resumed maintenance medication on admission.    Hypertension- (present on admission)  Assessment & Plan  Chronic condition treated with lisinopril, metoprolol.  Resumed maintenance medication on admission.    No contraindication to deep vein thrombosis (DVT) prophylaxis- (present on admission)  Assessment & Plan  Prophylactic dose enoxaparin initiated upon admission.    Encounter for screening for COVID-19- (present on admission)  Assessment & Plan  1/27 COVID-19 specimen sent. AIRBORNE & CONTACT/EYE ISOLATION implemented pending final SARS-CoV-2 testing.    Fracture of left hip (HCC)- (present on admission)  Assessment & Plan  Left hip fracture.  No fracture seen on review by Dr Baeza.  Weight bearing status - Weightbearing as tolerated   Seth Baeza MD. Orthopedic Surgeon. [facility].    Trauma- (present on admission)  Assessment & Plan  Fall from a couch one night prior to presentation.   Found down by a neighbor after 6-7 hrs.  History of frequent falls  Trauma Green Transfer Activation.  Lee Grimaldo MD. Trauma Surgery.